# Patient Record
Sex: MALE | Race: WHITE | Employment: OTHER | URBAN - METROPOLITAN AREA
[De-identification: names, ages, dates, MRNs, and addresses within clinical notes are randomized per-mention and may not be internally consistent; named-entity substitution may affect disease eponyms.]

---

## 2017-01-06 ENCOUNTER — OFFICE VISIT (OUTPATIENT)
Dept: CARDIOLOGY CLINIC | Age: 82
End: 2017-01-06

## 2017-01-06 VITALS
SYSTOLIC BLOOD PRESSURE: 112 MMHG | WEIGHT: 192 LBS | HEART RATE: 68 BPM | DIASTOLIC BLOOD PRESSURE: 60 MMHG | BODY MASS INDEX: 26.04 KG/M2

## 2017-01-06 DIAGNOSIS — I35.1 NONRHEUMATIC AORTIC VALVE INSUFFICIENCY: ICD-10-CM

## 2017-01-06 DIAGNOSIS — I34.0 NON-RHEUMATIC MITRAL REGURGITATION: ICD-10-CM

## 2017-01-06 DIAGNOSIS — I10 ESSENTIAL HYPERTENSION: Primary | ICD-10-CM

## 2017-01-06 PROCEDURE — 99214 OFFICE O/P EST MOD 30 MIN: CPT | Performed by: INTERNAL MEDICINE

## 2017-05-24 RX ORDER — ATENOLOL 25 MG/1
TABLET ORAL
Qty: 30 TABLET | Refills: 6 | Status: SHIPPED | OUTPATIENT
Start: 2017-05-24 | End: 2018-04-11 | Stop reason: SDUPTHER

## 2017-05-30 ENCOUNTER — OFFICE VISIT (OUTPATIENT)
Dept: CARDIOLOGY CLINIC | Age: 82
End: 2017-05-30

## 2017-05-30 VITALS
HEART RATE: 62 BPM | SYSTOLIC BLOOD PRESSURE: 118 MMHG | BODY MASS INDEX: 26.42 KG/M2 | DIASTOLIC BLOOD PRESSURE: 60 MMHG | WEIGHT: 194.8 LBS

## 2017-05-30 DIAGNOSIS — I34.0 NON-RHEUMATIC MITRAL REGURGITATION: ICD-10-CM

## 2017-05-30 DIAGNOSIS — I35.1 NONRHEUMATIC AORTIC VALVE INSUFFICIENCY: ICD-10-CM

## 2017-05-30 DIAGNOSIS — I10 ESSENTIAL HYPERTENSION: Primary | ICD-10-CM

## 2017-05-30 PROCEDURE — 99214 OFFICE O/P EST MOD 30 MIN: CPT | Performed by: INTERNAL MEDICINE

## 2017-12-08 ENCOUNTER — OFFICE VISIT (OUTPATIENT)
Dept: CARDIOLOGY CLINIC | Age: 82
End: 2017-12-08

## 2017-12-08 VITALS
DIASTOLIC BLOOD PRESSURE: 78 MMHG | BODY MASS INDEX: 26.18 KG/M2 | WEIGHT: 193 LBS | SYSTOLIC BLOOD PRESSURE: 126 MMHG | HEART RATE: 68 BPM

## 2017-12-08 DIAGNOSIS — I10 ESSENTIAL HYPERTENSION: Primary | ICD-10-CM

## 2017-12-08 DIAGNOSIS — I35.1 NONRHEUMATIC AORTIC VALVE INSUFFICIENCY: ICD-10-CM

## 2017-12-08 DIAGNOSIS — I34.0 NON-RHEUMATIC MITRAL REGURGITATION: ICD-10-CM

## 2017-12-08 PROCEDURE — 99214 OFFICE O/P EST MOD 30 MIN: CPT | Performed by: INTERNAL MEDICINE

## 2017-12-08 NOTE — PROGRESS NOTES
Subjective:      Patient ID: Devi Traore is a 80 y.o. male. HPI:  Altaf Cao is being seen for his 4 month follow up appointment  HTN/AI/MR. Continues to do well. No exertional sx. No edema. No pnd or orthopnea. BP ok at home. Wt stable. Past Medical History:   Diagnosis Date    Aortic valve disorders     Essential hypertension     Hearing loss     Hyperlipidemia     Hypertension     Kidney stone     Mitral valve disorders(424.0)      Past Surgical History:   Procedure Laterality Date    ANKLE SURGERY      FRACTURE SURGERY      JOINT REPLACEMENT      MASTOID SURGERY      TOTAL KNEE ARTHROPLASTY         No Known Allergies     Social History     Social History    Marital status:      Spouse name: N/A    Number of children: 5    Years of education: N/A     Occupational History    Not on file. Social History Main Topics    Smoking status: Never Smoker    Smokeless tobacco: Never Used    Alcohol use 0.0 oz/week      Comment: Occasionally    Drug use: No    Sexual activity: Not on file     Other Topics Concern    Not on file     Social History Narrative    ** Merged History Encounter **             Patient has a family history includes Cancer in his father and mother. Patient  has a past medical history of Aortic valve disorders; Essential hypertension; Hearing loss; Hyperlipidemia; Hypertension; Kidney stone; and Mitral valve disorders(424.0). Current Outpatient Prescriptions   Medication Sig Dispense Refill    atenolol (TENORMIN) 25 MG tablet TAKE 1 TABLET BY MOUTH DAILY. 30 tablet 6    atorvastatin (LIPITOR) 40 MG tablet Take 40 mg by mouth daily.  hydrochlorothiazide (HYDRODIURIL) 25 MG tablet Take 1 tablet by mouth daily. 30 tablet 6    potassium chloride (KLOR-CON) 20 MEQ packet Take 20 mEq by mouth twice a week.  aspirin 81 MG EC tablet Take 81 mg by mouth daily. No current facility-administered medications for this visit. Vitals  Weight: 193 lb (87.5 kg)  Blood Pressure: BP: (126)/(78)    Pulse: 68           Review of Systems   Constitutional: Negative for activity change and fatigue. Respiratory: Negative for apnea, cough, choking, chest tightness and shortness of breath. Cardiovascular: Negative for chest pain, palpitations and leg swelling. No PND or orthopnea. No tachycardia. Gastrointestinal: Negative for abdominal distention. Musculoskeletal: Negative for myalgias. Neurological: Negative for dizziness, syncope and light-headedness. Psychiatric/Behavioral: Negative for behavioral problems and confusion. Other systems reviewed negative as done. Objective:   Physical Exam   Constitutional: He is oriented to person, place, and time. He appears well-developed and well-nourished. No distress. HENT:   Head: Normocephalic and atraumatic. Eyes: Conjunctivae and EOM are normal. Right eye exhibits no discharge. Left eye exhibits no discharge. Neck: Normal range of motion. Neck supple. No JVD present. Cardiovascular: Normal rate, regular rhythm, S1 normal and S2 normal.  Exam reveals no gallop. Murmur heard. Systolic murmur is present with a grade of 1/6   Pulses:       Radial pulses are 2+ on the right side, and 2+ on the left side. Pulmonary/Chest: Effort normal and breath sounds normal. No respiratory distress. He has no wheezes. He has no rales. Abdominal: Soft. Bowel sounds are normal. No tenderness. Musculoskeletal: Normal range of motion. He exhibits no edema and no tenderness. Neurological: He is alert and oriented to person, place, and time. Skin: Skin is warm and dry. Psychiatric: He has a normal mood and affect. His behavior is normal. Thought content normal.       Assessment:       1. Essential hypertension     2. Nonrheumatic aortic valve insufficiency     3. Non-rheumatic mitral regurgitation             Plan:      CV stable. Remains compensated. bp is good. . Remains active without sx. No changes. Continue to monitor. Reviewed previous records and testing including echo 8/16. Follow up 4 months.

## 2018-04-06 ENCOUNTER — OFFICE VISIT (OUTPATIENT)
Dept: CARDIOLOGY CLINIC | Age: 83
End: 2018-04-06

## 2018-04-06 VITALS
WEIGHT: 194 LBS | DIASTOLIC BLOOD PRESSURE: 70 MMHG | HEART RATE: 60 BPM | SYSTOLIC BLOOD PRESSURE: 118 MMHG | BODY MASS INDEX: 26.31 KG/M2

## 2018-04-06 DIAGNOSIS — I10 ESSENTIAL HYPERTENSION: Primary | ICD-10-CM

## 2018-04-06 DIAGNOSIS — I34.0 NON-RHEUMATIC MITRAL REGURGITATION: ICD-10-CM

## 2018-04-06 DIAGNOSIS — I35.1 NONRHEUMATIC AORTIC VALVE INSUFFICIENCY: ICD-10-CM

## 2018-04-06 PROCEDURE — 99214 OFFICE O/P EST MOD 30 MIN: CPT | Performed by: INTERNAL MEDICINE

## 2018-04-17 ENCOUNTER — TELEPHONE (OUTPATIENT)
Dept: CARDIOLOGY CLINIC | Age: 83
End: 2018-04-17

## 2018-04-17 RX ORDER — ATENOLOL 25 MG/1
TABLET ORAL
Qty: 30 TABLET | Refills: 5 | Status: SHIPPED | OUTPATIENT
Start: 2018-04-17 | End: 2018-11-03 | Stop reason: SDUPTHER

## 2018-08-17 ENCOUNTER — OFFICE VISIT (OUTPATIENT)
Dept: CARDIOLOGY CLINIC | Age: 83
End: 2018-08-17

## 2018-08-17 VITALS
HEART RATE: 64 BPM | WEIGHT: 196 LBS | BODY MASS INDEX: 26.58 KG/M2 | DIASTOLIC BLOOD PRESSURE: 68 MMHG | SYSTOLIC BLOOD PRESSURE: 102 MMHG

## 2018-08-17 DIAGNOSIS — I10 ESSENTIAL HYPERTENSION: Primary | ICD-10-CM

## 2018-08-17 DIAGNOSIS — I35.1 NONRHEUMATIC AORTIC VALVE INSUFFICIENCY: ICD-10-CM

## 2018-08-17 DIAGNOSIS — I34.0 NON-RHEUMATIC MITRAL REGURGITATION: ICD-10-CM

## 2018-08-17 PROCEDURE — 99214 OFFICE O/P EST MOD 30 MIN: CPT | Performed by: INTERNAL MEDICINE

## 2018-08-17 NOTE — PROGRESS NOTES
active. No changes. Continue to monitor. Reviewed previous records and testing including echo 8/16. Follow up 4 months.

## 2018-11-08 RX ORDER — ATENOLOL 25 MG/1
TABLET ORAL
Qty: 30 TABLET | Refills: 5 | Status: SHIPPED | OUTPATIENT
Start: 2018-11-08 | End: 2019-06-15 | Stop reason: SDUPTHER

## 2019-01-11 ENCOUNTER — OFFICE VISIT (OUTPATIENT)
Dept: CARDIOLOGY CLINIC | Age: 84
End: 2019-01-11
Payer: MEDICARE

## 2019-01-11 VITALS
HEART RATE: 54 BPM | SYSTOLIC BLOOD PRESSURE: 124 MMHG | DIASTOLIC BLOOD PRESSURE: 70 MMHG | BODY MASS INDEX: 26.94 KG/M2 | WEIGHT: 198.6 LBS

## 2019-01-11 DIAGNOSIS — I10 ESSENTIAL HYPERTENSION: Primary | ICD-10-CM

## 2019-01-11 DIAGNOSIS — I35.1 NONRHEUMATIC AORTIC VALVE INSUFFICIENCY: ICD-10-CM

## 2019-01-11 DIAGNOSIS — I34.0 NON-RHEUMATIC MITRAL REGURGITATION: ICD-10-CM

## 2019-01-11 PROCEDURE — 99214 OFFICE O/P EST MOD 30 MIN: CPT | Performed by: INTERNAL MEDICINE

## 2019-04-30 ENCOUNTER — HOSPITAL ENCOUNTER (OUTPATIENT)
Dept: PHYSICAL THERAPY | Age: 84
Setting detail: THERAPIES SERIES
Discharge: HOME | End: 2019-04-30
Payer: MEDICARE

## 2019-04-30 PROCEDURE — 97530 THERAPEUTIC ACTIVITIES: CPT | Performed by: CHIROPRACTOR

## 2019-04-30 PROCEDURE — 97161 PT EVAL LOW COMPLEX 20 MIN: CPT | Performed by: CHIROPRACTOR

## 2019-04-30 NOTE — FLOWSHEET NOTE
Physical Therapy Daily Treatment Note  Date:  2019    Patient Name:  John Song    :  10/22/1925  MRN: [id number]  Restrictions/Precautions:    Pertinent Medical History:  Medical/Treatment Diagnosis Information:  · Diagnosis: Leg weakness, Decreased Balance  ·    Insurance/Certification information:    [employer]  Physician Information:      Dr. Aditi Medrano of care signed (Y/N):  Faxed   Visit# / total visits:  1/  Pain level: 0/10     G-Code (if applicable):  LEFS - 43       Progress Note: []  Yes  []  No  Next due by: Visit #10      History of Injury: Pt has had 3 falls in the last month    Subjective:   C/o decreased balance / \"shuffling gait\"    Objective:  Observation:   Test measurements:      Exercises:  Exercise/Equipment Resistance/Repetitions Other comments        Reviewed home exer          Nu-step X 1 min    GSS X 30 sec    HSS     HR/TR     Stand abd 0# - 1x10   R/L    Rocker board  (f/b, s/s) x30 sec    PWB gumdrop circles Cw/ccw x10   R/L         In P-bars     tandem stance X 30 sec     R/L    Toe taps       6\" x10    R/L    Tandem gait      Lateral gait                      Other Therapeutic Activities:      Home Exercise Program:  Voiced understanding of home exer    Manual Treatments:      Modalities:      Timed Code Treatment Minutes:  30    Total Treatment Minutes:  45    Assessment  [x] Patient tolerated treatment well [] Patient limited by fatigue  [] Patient limited by pain  [] Patient limited by other medical complications  [] Other:         Prognosis: [x] Good [] Fair  [] Poor    Patient Requires Follow-up: [x] Yes  [] No    Plan:   [x] Continue per plan of care [] Alter current plan (see comments)  [] Plan of care initiated [] Hold pending MD visit [] Discharge  Plan for Next Session:  Increase exer as above    Electronically signed by:  Jorge Alberto MORRELL#[id number]

## 2019-04-30 NOTE — PROGRESS NOTES
Physical Therapy  Initial Assessment  Date: 2019  Patient Name: Jessica Jiménez  MRN: [id number]  : 10/22/1925          Restrictions  Restrictions/Precautions  Restrictions/Precautions: Fall Risk(Mod fall risk)    Subjective   General  Chart Reviewed: Yes  Patient assessed for rehabilitation services?: Yes  Additional Pertinent Hx: HTN, TKA  Family / Caregiver Present: No  Referring Practitioner: Dr. Sun Valdez  Referral Date : 19  Diagnosis: Leg weakness, Decreased Balance  PT Visit Information  Onset Date: (Pt states he has had 3 falls in the last month)  PT Insurance Information: [facility] Medicare  Total # of Visits to Date: 1  Subjective  Subjective: C/o decreased balance   Pain Screening  Patient Currently in Pain: (No pain at present.  Only had pain after his falls)         Objective     Observation/Palpation  Posture: Good    PROM RLE (degrees)  RLE PROM: WFL  PROM LLE (degrees)  LLE PROM: [facility]    Strength RLE  Strength RLE: (Ankle DF/PF 3/5, knee flex/ext 5/5, hip flex 4/5, ext 5/5)  Strength LLE  Strength LLE: (Ankle DF/PF 3/5, knee flex/ext 5/5, hip flex 4/5, ext 5/5)     Additional Measures  Other: Unable to single leg balance without handheld assist  Sensation  Overall Sensation Status: [facility]             Ambulation  Ambulation?: (Amb without any assistive device, but with shortened \"shuffling\" gait)                            Assessment   Conditions Requiring Skilled Therapeutic Intervention  Body structures, Functions, Activity limitations: Decreased balance;Decreased strength  Assessment: Prior level of function: Amb with falling  Prognosis: Good  Decision Making: Low Complexity  REQUIRES PT FOLLOW UP: Yes  Activity Tolerance  Activity Tolerance: Patient Tolerated treatment well         Plan   Plan  Times per week: 2x/wk x 6 weeks  Current Treatment Recommendations: Strengthening, Balance Training, Home Exercise Program    G-Code       OutComes Score  LEFS Total Score: 43                                                      Goals  Short term goals  Time Frame for Short term goals: 3 weeks  Short term goal 1: Be independent with home exer  Short term goal 2: Improve gait pattern  Long term goals  Time Frame for Long term goals : 6 weeks  Long term goal 1: Eliminate falls  Patient Goals   Patient goals : \"Not to fall\"        Chris Wood #[id number]

## 2019-05-06 ENCOUNTER — HOSPITAL ENCOUNTER (OUTPATIENT)
Dept: PHYSICAL THERAPY | Age: 84
Setting detail: THERAPIES SERIES
Discharge: HOME | End: 2019-05-06
Payer: MEDICARE

## 2019-05-06 PROCEDURE — 97110 THERAPEUTIC EXERCISES: CPT | Performed by: CHIROPRACTOR

## 2019-05-06 NOTE — FLOWSHEET NOTE
Physical Therapy Daily Treatment Note  Date:  2019    Patient Name:  Garrison Zhang    :  10/22/1925  MRN: [id number]  Restrictions/Precautions:    Pertinent Medical History:  Medical/Treatment Diagnosis Information:  · Diagnosis: Leg weakness, Decreased Balance     Insurance/Certification information:    [employer]  Physician Information:      Dr. Nathaniel Morales of care signed (Y/N): Yes    Visit# / total visits:  2/7   By 19  Pain level: 0/10     G-Code (if applicable):  LEFS - 43       Progress Note: []  Yes  []  No  Next due by: Visit #10      History of Injury: Pt has had 3 falls in the last month    Subjective:   C/o decreased balance / \"shuffling gait\"    Objective:  Observation:   Test measurements:      Exercises:  Exercise/Equipment Resistance/Repetitions Other comments        Reviewed home exer          Nu-step X4  min    GSS  30 sec x 2    HSS 30 sec x2    In/Ev x10    HR/TR x10    Stand abd 0# - 2x10   R/L    Rocker board  (f/b, s/s) x30 sec    PWB gumdrop circles Cw/ccw x10   R/L         In P-bars     tandem stance X 30 sec     R/L    Toe taps       6\" x10    R/L    Tandem gait  X 2 lengths    Lateral gait X 3 lengths    BOSU Balance - no rails  March x 30 sec - 2 rails                Other Therapeutic Activities:      Home Exercise Program:  Voiced understanding of home exer    Manual Treatments:      Modalities:      Timed Code Treatment Minutes:  30    Total Treatment Minutes:  45    Assessment  [x] Patient tolerated treatment well [] Patient limited by fatigue  [] Patient limited by pain  [] Patient limited by other medical complications  [] Other:         Prognosis: [x] Good [] Fair  [] Poor    Patient Requires Follow-up: [x] Yes  [] No    Plan:   [x] Continue per plan of care [] Alter current plan (see comments)  [] Plan of care initiated [] Hold pending MD visit [] Discharge  Plan for Next Session:  Increase exer as above    Electronically signed by: Marcos Mueller BC#[id number]

## 2019-05-10 ENCOUNTER — HOSPITAL ENCOUNTER (OUTPATIENT)
Dept: PHYSICAL THERAPY | Age: 84
Setting detail: THERAPIES SERIES
Discharge: HOME | End: 2019-05-10
Payer: MEDICARE

## 2019-05-10 PROCEDURE — 97530 THERAPEUTIC ACTIVITIES: CPT

## 2019-05-10 PROCEDURE — 97110 THERAPEUTIC EXERCISES: CPT

## 2019-05-10 NOTE — FLOWSHEET NOTE
Physical Therapy Daily Treatment Note  Date:  5/10/2019    Patient Name:  Alana Martinez    :  10/22/1925  MRN: [id number]  Restrictions/Precautions:    Pertinent Medical History:  Medical/Treatment Diagnosis Information:  · Diagnosis: Leg weakness, Decreased Balance     Insurance/Certification information:    [employer]  Physician Information:      Dr. Isaías Chaidez of care signed (Y/N): Yes    Visit# / total visits:  3/7   By 19  Pain level: 0/10     G-Code (if applicable):  LEFS - 43       Progress Note: []  Yes  []  No  Next due by: Visit #10      History of Injury: Pt has had 3 falls in the last month    Subjective:   C/o decreased balance / \"shuffling gait\"     Objective:  Observation:   Test measurements:      Exercises:  Exercise/Equipment Resistance/Repetitions Other comments        Reviewed home exer          Nu-step X5  min    GSS  30 sec x 2    HSS 30 sec x2    In/Ev x10    HR/TR x10    Stand abd 0# - 2x10   R/L    Rocker board  (f/b, s/s) x30 sec    PWB gumdrop circles Cw/ccw x10   R/L         In P-bars     tandem stance X 30 sec     R/L    Toe taps       6\" x10    R/L    Tandem gait  X 2 lengths    Lateral gait X 3 lengths    BOSU Balance - no rails  March x 30 sec - 2 rails    1 foot on step 30 sec R/L         Gait 2 laps around clinic focusing on heel strike and toe push off, longer strides      Other Therapeutic Activities:      Home Exercise Program:  Voiced understanding of home exer    Manual Treatments:      Modalities:      Timed Code Treatment Minutes:  40    Total Treatment Minutes:  40    Assessment  [x] Patient tolerated treatment well [] Patient limited by fatigue  [] Patient limited by pain  [] Patient limited by other medical complications  [] Other:         Prognosis: [x] Good [] Fair  [] Poor    Patient Requires Follow-up: [x] Yes  [] No    Plan:   [x] Continue per plan of care [] Alter current plan (see comments)  [] Plan of care initiated [] Hold pending MD visit [] Discharge  Plan for Next Session:  Increase exer as above    Electronically signed by:  Tyson Kyle, PTA [id number]

## 2019-05-13 ENCOUNTER — HOSPITAL ENCOUNTER (OUTPATIENT)
Dept: PHYSICAL THERAPY | Age: 84
Setting detail: THERAPIES SERIES
Discharge: HOME | End: 2019-05-13
Payer: MEDICARE

## 2019-05-13 PROCEDURE — 97110 THERAPEUTIC EXERCISES: CPT | Performed by: CHIROPRACTOR

## 2019-05-13 NOTE — FLOWSHEET NOTE
Physical Therapy Daily Treatment Note  Date:  2019    Patient Name:  Karrie Buerger    :  10/22/1925  MRN: [id number]  Restrictions/Precautions:    Pertinent Medical History:  Medical/Treatment Diagnosis Information:  · Diagnosis: Leg weakness, Decreased Balance     Insurance/Certification information:    [employer] [facility] Medicare  Physician Information:      Dr. Galicia [facility] of care signed (Y/N): Yes    Visit# / total visits:  4/7   By 19  Pain level: 0/10     G-Code (if applicable):  LEFS - 43       Progress Note: []  Yes  []  No  Next due by: Visit #10      History of Injury: Pt has had 3 falls in the last month    Subjective:  States he has not had any falls    Objective:  Observation:   Test measurements:      Exercises:  Exercise/Equipment Resistance/Repetitions Other comments        Reviewed home exer          Nu-step X5  min    GSS  30 sec x 2    HSS 30 sec x2    In/Ev x10    HR/TR x10    Stand abd 1# - 2x10   R/L    Rocker board  (f/b, s/s) x30 sec    PWB gumdrop circles Cw/ccw x10   R/L         In P-bars     tandem stance X 45 sec     R/L    Toe taps       6\" x10    R/L    Tandem gait  X 2 lengths    Lateral gait X 2 lengths    BOSU Balance - no rails  March x 30 sec - 2 rails    1 foot on step 30 sec R/L         Gait 2 laps around clinic focusing on heel strike and toe push off, longer strides      Other Therapeutic Activities:      Home Exercise Program:  Voiced understanding of home exer    Manual Treatments:      Modalities:      Timed Code Treatment Minutes:  35    Total Treatment Minutes:  35    Assessment  [x] Patient tolerated treatment well [] Patient limited by fatigue  [] Patient limited by pain  [] Patient limited by other medical complications  [] Other:         Prognosis: [x] Good [] Fair  [] Poor    Patient Requires Follow-up: [x] Yes  [] No    Plan:   [x] Continue per plan of care [] Alter current plan (see comments)  [] Plan of care initiated [] Hold pending MD visit [] Discharge  Plan for Next Session:  Increase exer as above    Electronically signed by:  Hilma Runner #[id number]

## 2019-05-17 ENCOUNTER — HOSPITAL ENCOUNTER (OUTPATIENT)
Dept: PHYSICAL THERAPY | Age: 84
Setting detail: THERAPIES SERIES
Discharge: HOME | End: 2019-05-17
Payer: MEDICARE

## 2019-05-17 PROCEDURE — 97110 THERAPEUTIC EXERCISES: CPT | Performed by: CHIROPRACTOR

## 2019-05-17 NOTE — FLOWSHEET NOTE
Physical Therapy Daily Treatment Note  Date:  2019    Patient Name:  Makeda Dias    :  10/22/1925  MRN: [id number]  Restrictions/Precautions:    Pertinent Medical History:  Medical/Treatment Diagnosis Information:  · Diagnosis: Leg weakness, Decreased Balance     Insurance/Certification information:    [employer]  Physician Information:      Dr. Varun Cruz of care signed (Y/N): Yes    Visit# / total visits:     By 19  Pain level: 0/10     G-Code (if applicable):  LEFS - 43       Progress Note: []  Yes  []  No  Next due by: Visit #10      History of Injury: Pt has had 3 falls in the last month    Subjective:   Feeling tired today    Objective:  Observation:   Test measurements:      Exercises:  Exercise/Equipment Resistance/Repetitions Other comments        Reviewed home exer          Nu-step        #8 X5  min    GSS  30 sec x 2    HSS 30 sec x2    In/Ev x10    HR/TR x10    Stand abd 1# - 2x10   R/L    Rocker board  (f/b, s/s) x30 sec    PWB gumdrop circles Cw/ccw x10   R/L         In P-bars     tandem stance X 45 sec     R/L    Toe taps       6\" x10    R/L    Tandem gait  X 2 lengths    Lateral gait X 2 lengths    BOSU Balance - no rails  March x 30 sec - 2 rails    1 foot on step 30 sec R/L         Gait Worked on increasing hip flexion      Other Therapeutic Activities:      Home Exercise Program:  Voiced understanding of home exer    Manual Treatments:      Modalities:      Timed Code Treatment Minutes:  35    Total Treatment Minutes:  35    Assessment  [x] Patient tolerated treatment well [] Patient limited by fatigue  [] Patient limited by pain  [] Patient limited by other medical complications  [] Other:         Prognosis: [x] Good [] Fair  [] Poor    Patient Requires Follow-up: [x] Yes  [] No    Plan:   [x] Continue per plan of care [] Alter current plan (see comments)  [] Plan of care initiated [] Hold pending MD visit [] Discharge  Plan for Next Session:  Increase exer as above    Electronically signed by:  Kale LANGLEY#[id number]

## 2019-05-20 ENCOUNTER — HOSPITAL ENCOUNTER (OUTPATIENT)
Dept: PHYSICAL THERAPY | Age: 84
Setting detail: THERAPIES SERIES
Discharge: HOME | End: 2019-05-20
Payer: MEDICARE

## 2019-05-20 PROCEDURE — 97110 THERAPEUTIC EXERCISES: CPT | Performed by: CHIROPRACTOR

## 2019-05-20 NOTE — FLOWSHEET NOTE
Physical Therapy Daily Treatment Note  Date:  2019    Patient Name:  Blake Mcbride    :  10/22/1925  MRN: [id number]  Restrictions/Precautions:    Pertinent Medical History:  Medical/Treatment Diagnosis Information:  · Diagnosis: Leg weakness, Decreased Balance     Insurance/Certification information:    [employer]  Physician Information:      Dr. Grazyna Aguirre of care signed (Y/N): Yes    Visit# / total visits:     By 19  Pain level: 0/10     G-Code (if applicable):  LEFS - 43       Progress Note: []  Yes  []  No  Next due by: Visit #10      History of Injury: Pt has had 3 falls in the last month    Subjective:  Feeling much better.  Has not had any falls /loss of balance    Objective:  Observation:   Test measurements:      Exercises:  Exercise/Equipment Resistance/Repetitions Other comments        Reviewed home exer          Nu-step        #8 X5  min    GSS  30 sec x 2    HSS 30 sec x2    In/Ev x10    HR/TR x10    Stand abd 1# - 2x10   R/L    Rocker board  (f/b, s/s) x30 sec    PWB gumdrop circles Cw/ccw x10   R/L         In P-bars     tandem stance X 45 sec     R/L    Toe taps       6\" x10    R/L    Tandem gait  X 2 lengths    Lateral gait X 2 lengths    BOSU Balance - no rails  March x 30 sec - 2 rails    1 foot on step 30 sec R/L         Gait Worked on increasing hip flexion Improved     Other Therapeutic Activities:      Home Exercise Program:  Voiced understanding of home exer    Manual Treatments:      Modalities:      Timed Code Treatment Minutes:  35    Total Treatment Minutes:  35    Assessment  [x] Patient tolerated treatment well [] Patient limited by fatigue  [] Patient limited by pain  [] Patient limited by other medical complications  [] Other:         Prognosis: [x] Good [] Fair  [] Poor    Patient Requires Follow-up: [x] Yes  [] No    Plan:   [x] Continue per plan of care [] Alter current plan (see comments)  [] Plan of care initiated [] Hold

## 2019-05-24 ENCOUNTER — HOSPITAL ENCOUNTER (OUTPATIENT)
Dept: PHYSICAL THERAPY | Age: 84
Setting detail: THERAPIES SERIES
Discharge: HOME | End: 2019-05-24
Payer: MEDICARE

## 2019-05-24 PROCEDURE — 97530 THERAPEUTIC ACTIVITIES: CPT

## 2019-05-24 PROCEDURE — 97110 THERAPEUTIC EXERCISES: CPT

## 2019-05-24 NOTE — FLOWSHEET NOTE
Physical Therapy Daily Treatment Note  Date:  2019    Patient Name:  Alivia Hanks    :  10/22/1925  MRN: [id number]  Restrictions/Precautions:    Pertinent Medical History:  Medical/Treatment Diagnosis Information:  · Diagnosis: Leg weakness, Decreased Balance     Insurance/Certification information:    [employer]  Physician Information:      Dr. Heather Geiger of care signed (Y/N): Yes    Visit# / total visits:     By 19  Pain level: 0/10     G-Code (if applicable):  LEFS - 43       Progress Note: []  Yes  []  No  Next due by: Visit #10      History of Injury: Pt has had 3 falls in the last month    Subjective:      Objective:  Observation:   Test measurements:      Exercises:  Exercise/Equipment Resistance/Repetitions Other comments        Reviewed home exer          Nu-step        #8 X5  min    GSS  30 sec x 2    HSS 30 sec x2    In/Ev x10    HR/TR x10    Stand abd             # - 2x10   R/L    Rocker board  (f/b, s/s) x30 sec    PWB gumdrop circles Cw/ccw x10   R/L         In P-bars     tandem stance X 45 sec     R/L    Toe taps       6\" x10    R/L    Tandem gait  X 2 lengths    Lateral gait X 2 lengths    BOSU Balance - no rails  March x 30 sec - 2 rails    1 foot on gumdrop 30 sec R/L         Gait Worked on increasing hip flexion Improved     Other Therapeutic Activities:      Home Exercise Program:  Voiced understanding of home exer  19: updated written HEP    Manual Treatments:      Modalities:      Timed Code Treatment Minutes:  35    Total Treatment Minutes:  35    Assessment  [x] Patient tolerated treatment well [] Patient limited by fatigue  [] Patient limited by pain  [] Patient limited by other medical complications  [] Other:         Prognosis: [x] Good [] Fair  [] Poor    Patient Requires Follow-up: [x] Yes  [] No    Plan:   [] Continue per plan of care [] Alter current plan (see comments)  [] Plan of care initiated [] Hold pending MD visit [x] Discharge  Plan for Next Session:  D/C with HEP    Electronically signed by:  Gladys Oates, PTA [id number]

## 2019-06-17 RX ORDER — ATENOLOL 25 MG/1
TABLET ORAL
Qty: 30 TABLET | Refills: 5 | Status: SHIPPED | OUTPATIENT
Start: 2019-06-17 | End: 2019-12-11 | Stop reason: SDUPTHER

## 2019-06-17 NOTE — TELEPHONE ENCOUNTER
Requested Prescriptions     Pending Prescriptions Disp Refills    atenolol (TENORMIN) 25 MG tablet [Pharmacy Med Name: ATENOLOL 25 MG TABLET] 30 tablet 1     Sig: TAKE 1 TABLET BY MOUTH DAILY.        Number: 30    Refills: 5    Last Office Visit: 01/11/2019    Next Office Visit: 06/25/2019    Last Refill: 11/08/2018    Last Labs: 04/12/2017 CBC

## 2019-06-21 NOTE — DISCHARGE SUMMARY
Physical Therapy Discharge Note  Date: 2019    Patient Name: John Song  : 10/22/1925  MRN: [id number]    Diagnosis: Leg weakness, Decreased Balance    Referring Physician:  Dr. Manny Lee    Dates of Service:   19 - 19  Total # of Visits:   7           Treatment to Date:  [x] Therapeutic Exercise  [] Modalities:  [x] Therapeutic Activity     [] Ultrasound  [] Electrical Stim   [x] Gait Training      [] Cervical Traction    [] Lumbar Traction  [] Neuromuscular Re-education  [] Cold/hotpack [] Iontophoresis  [x] Instruction in HEP      Other:  [] Manual Therapy       []    [] Aquatic Therapy       []                    ? Significant Findings At Last Visit:         Has not had anymore falls  Independent with home exer       Goal Status:  [x] Achieved [] Partially Achieved  [] Not Achieved     Reason for Discharge:  [x] Goals Met   [] Patient did not return after initial evaluation   [] Progress Plateaued [] Missed _____ scheduled appointments   [] No insurance coverage [] Patient terminated therapy   [] Other:        PT recommendation:   [x] Patient now discharged with HEP      [] Other:    Discharge discussed with:  [x] Patient  [] Caregiver      [] Other        Electronically signed by:  Jorge Alberto GOLDEN#[id number]    If you have any questions or concerns, please don't hesitate to call.   Thank you for your referral.
DISCHARGE

## 2019-06-25 ENCOUNTER — OFFICE VISIT (OUTPATIENT)
Dept: CARDIOLOGY CLINIC | Age: 84
End: 2019-06-25
Payer: MEDICARE

## 2019-06-25 VITALS
WEIGHT: 197 LBS | SYSTOLIC BLOOD PRESSURE: 118 MMHG | BODY MASS INDEX: 26.72 KG/M2 | HEART RATE: 68 BPM | DIASTOLIC BLOOD PRESSURE: 70 MMHG

## 2019-06-25 DIAGNOSIS — I34.0 NON-RHEUMATIC MITRAL REGURGITATION: ICD-10-CM

## 2019-06-25 DIAGNOSIS — I10 ESSENTIAL HYPERTENSION: Primary | ICD-10-CM

## 2019-06-25 DIAGNOSIS — I35.1 NONRHEUMATIC AORTIC VALVE INSUFFICIENCY: ICD-10-CM

## 2019-06-25 PROCEDURE — 99214 OFFICE O/P EST MOD 30 MIN: CPT | Performed by: INTERNAL MEDICINE

## 2019-06-25 NOTE — PROGRESS NOTES
Subjective:      Patient ID: Clare Rock is a 80 y.o. male. HPI:  Priscilla Salas is being seen for his 4 month follow up appointment  HTN/AI/MR. No complaints. Keeps active  No exertional sx. No edema. No pnd or orthopnea. No palp/tachy/syncope. Wt stable. Still takng stairs. No sob. Past Medical History:   Diagnosis Date    Aortic valve disorders     Essential hypertension     Hearing loss     Hyperlipidemia     Hypertension     Kidney stone     Mitral valve disorders(424.0)      Past Surgical History:   Procedure Laterality Date    ANKLE SURGERY      FRACTURE SURGERY      JOINT REPLACEMENT      MASTOID SURGERY      TOTAL KNEE ARTHROPLASTY         No Known Allergies     Social History     Socioeconomic History    Marital status:      Spouse name: Not on file    Number of children: 11    Years of education: Not on file    Highest education level: Not on file   Occupational History    Not on file   Social Needs    Financial resource strain: Not on file    Food insecurity:     Worry: Not on file     Inability: Not on file    Transportation needs:     Medical: Not on file     Non-medical: Not on file   Tobacco Use    Smoking status: Never Smoker    Smokeless tobacco: Never Used   Substance and Sexual Activity    Alcohol use:  Yes     Alcohol/week: 0.0 oz     Comment: Occasionally    Drug use: No    Sexual activity: Not on file   Lifestyle    Physical activity:     Days per week: Not on file     Minutes per session: Not on file    Stress: Not on file   Relationships    Social connections:     Talks on phone: Not on file     Gets together: Not on file     Attends Jain service: Not on file     Active member of club or organization: Not on file     Attends meetings of clubs or organizations: Not on file     Relationship status: Not on file    Intimate partner violence:     Fear of current or ex partner: Not on file     Emotionally abused: Not on file Physically abused: Not on file     Forced sexual activity: Not on file   Other Topics Concern    Not on file   Social History Narrative    ** Merged History Encounter **             Patient has a family history includes Cancer in his father and mother. Patient  has a past medical history of Aortic valve disorders, Essential hypertension, Hearing loss, Hyperlipidemia, Hypertension, Kidney stone, and Mitral valve disorders(424.0). Current Outpatient Medications   Medication Sig Dispense Refill    atenolol (TENORMIN) 25 MG tablet TAKE 1 TABLET BY MOUTH DAILY. 30 tablet 5    atorvastatin (LIPITOR) 40 MG tablet Take 40 mg by mouth daily.  hydrochlorothiazide (HYDRODIURIL) 25 MG tablet Take 1 tablet by mouth daily. 30 tablet 6    potassium chloride (KLOR-CON) 20 MEQ packet Take 20 mEq by mouth twice a week.  aspirin 81 MG EC tablet Take 81 mg by mouth daily. No current facility-administered medications for this visit. Vitals:    06/25/19 0948   BP: 118/70   Pulse: 68       wt 197          Review of Systems   Constitutional: Negative for activity change and fatigue. Respiratory: Negative for apnea, cough, choking, chest tightness and shortness of breath. Cardiovascular: Negative for chest pain, palpitations and leg swelling. No PND or orthopnea. No tachycardia. Gastrointestinal: Negative for abdominal distention. Musculoskeletal: Negative for myalgias. Neurological: Negative for dizziness, syncope and light-headedness. Psychiatric/Behavioral: Negative for behavioral problems and confusion. All other systems reviewed negative as done. Objective:   Physical Exam   Constitutional: He is oriented to person, place, and time. He appears well-developed and well-nourished. No distress. HENT:   Head: Normocephalic and atraumatic. Eyes: Conjunctivae and EOM are normal. Right eye exhibits no discharge. Left eye exhibits no discharge. Neck: Normal range of motion. Neck supple. No JVD present. Cardiovascular: Normal rate, regular rhythm, S1 normal and S2 normal.  Exam reveals no gallop. Murmur heard. Systolic murmur is present with a grade of 1/6   Pulses:       Radial pulses are 2+ on the right side, and 2+ on the left side. Pulmonary/Chest: Effort normal and breath sounds normal. No respiratory distress. He has no wheezes. He has no rales. Abdominal: Soft. Bowel sounds are normal. No tenderness. Musculoskeletal: Normal range of motion. He exhibits no edema and no tenderness. Neurological: He is alert and oriented to person, place, and time. Skin: Skin is warm and dry. Psychiatric: He has a normal mood and affect. His behavior is normal. Thought content normal.       Assessment:        Diagnosis Orders   1. Essential hypertension     2. Nonrheumatic aortic valve insufficiency     3. Non-rheumatic mitral regurgitation             Plan:      CV stable. Remains compensated. bp is good. . Remains active. No changes. Continue to monitor. Reviewed previous records and testing including echo 8/16. Follow up 4 months.

## 2019-11-22 ENCOUNTER — OFFICE VISIT (OUTPATIENT)
Dept: CARDIOLOGY CLINIC | Age: 84
End: 2019-11-22
Payer: MEDICARE

## 2019-11-22 VITALS
DIASTOLIC BLOOD PRESSURE: 70 MMHG | HEART RATE: 68 BPM | SYSTOLIC BLOOD PRESSURE: 130 MMHG | WEIGHT: 190 LBS | BODY MASS INDEX: 25.77 KG/M2

## 2019-11-22 DIAGNOSIS — I35.1 NONRHEUMATIC AORTIC VALVE INSUFFICIENCY: ICD-10-CM

## 2019-11-22 DIAGNOSIS — I34.0 NONRHEUMATIC MITRAL VALVE REGURGITATION: ICD-10-CM

## 2019-11-22 DIAGNOSIS — I10 ESSENTIAL HYPERTENSION: Primary | ICD-10-CM

## 2019-11-22 PROCEDURE — 99214 OFFICE O/P EST MOD 30 MIN: CPT | Performed by: INTERNAL MEDICINE

## 2019-12-11 RX ORDER — ATENOLOL 25 MG/1
TABLET ORAL
Qty: 30 TABLET | Refills: 5 | Status: SHIPPED | OUTPATIENT
Start: 2019-12-11 | End: 2020-06-09

## 2020-06-12 RX ORDER — ATENOLOL 25 MG/1
TABLET ORAL
Qty: 90 TABLET | Refills: 3 | Status: SHIPPED | OUTPATIENT
Start: 2020-06-12 | End: 2021-07-06

## 2020-07-29 ENCOUNTER — OFFICE VISIT (OUTPATIENT)
Dept: CARDIOLOGY CLINIC | Age: 85
End: 2020-07-29
Payer: MEDICARE

## 2020-07-29 VITALS
WEIGHT: 189 LBS | HEART RATE: 68 BPM | BODY MASS INDEX: 25.63 KG/M2 | SYSTOLIC BLOOD PRESSURE: 110 MMHG | DIASTOLIC BLOOD PRESSURE: 60 MMHG | TEMPERATURE: 97.6 F

## 2020-07-29 PROCEDURE — 99214 OFFICE O/P EST MOD 30 MIN: CPT | Performed by: INTERNAL MEDICINE

## 2020-07-29 NOTE — PROGRESS NOTES
Subjective:      Patient ID: Kevin Abdul is a 80 y.o. male. HPI:  Jasper Apt is being seen for  follow up for  HTN/AI/MR. No complaints. Keeps active  Exercising on stairs. No exertional sx. No edema. No pnd or orthopnea. No palp/tachy/syncope. Wt stable. No sob. bp good. Past Medical History:   Diagnosis Date    Aortic valve disorders     Essential hypertension     Hearing loss     Hyperlipidemia     Hypertension     Kidney stone     Mitral valve disorders(424.0)      Past Surgical History:   Procedure Laterality Date    ANKLE SURGERY      FRACTURE SURGERY      JOINT REPLACEMENT      MASTOID SURGERY      TOTAL KNEE ARTHROPLASTY         No Known Allergies     Social History     Socioeconomic History    Marital status:      Spouse name: Not on file    Number of children: 11    Years of education: Not on file    Highest education level: Not on file   Occupational History    Not on file   Social Needs    Financial resource strain: Not on file    Food insecurity     Worry: Not on file     Inability: Not on file    Transportation needs     Medical: Not on file     Non-medical: Not on file   Tobacco Use    Smoking status: Never Smoker    Smokeless tobacco: Never Used   Substance and Sexual Activity    Alcohol use:  Yes     Alcohol/week: 0.0 standard drinks     Comment: Occasionally    Drug use: No    Sexual activity: Not on file   Lifestyle    Physical activity     Days per week: Not on file     Minutes per session: Not on file    Stress: Not on file   Relationships    Social connections     Talks on phone: Not on file     Gets together: Not on file     Attends Anabaptist service: Not on file     Active member of club or organization: Not on file     Attends meetings of clubs or organizations: Not on file     Relationship status: Not on file    Intimate partner violence     Fear of current or ex partner: Not on file     Emotionally abused: Not on file Physically abused: Not on file     Forced sexual activity: Not on file   Other Topics Concern    Not on file   Social History Narrative    ** Merged History Encounter **             Patient has a family history includes Cancer in his father and mother. Patient  has a past medical history of Aortic valve disorders, Essential hypertension, Hearing loss, Hyperlipidemia, Hypertension, Kidney stone, and Mitral valve disorders(424.0). Current Outpatient Medications   Medication Sig Dispense Refill    atenolol (TENORMIN) 25 MG tablet TAKE 1 TABLET BY MOUTH DAILY. 90 tablet 3    atorvastatin (LIPITOR) 40 MG tablet Take 40 mg by mouth daily.  hydrochlorothiazide (HYDRODIURIL) 25 MG tablet Take 1 tablet by mouth daily. 30 tablet 6    potassium chloride (KLOR-CON) 20 MEQ packet Take 20 mEq by mouth twice a week.  aspirin 81 MG EC tablet Take 81 mg by mouth daily. No current facility-administered medications for this visit. Vitals:    07/29/20 1119   BP: 110/60   Pulse: 68   Temp: 97.6 °F (36.4 °C)       wt 189          Review of Systems   Constitutional: Negative for activity change and fatigue. Respiratory: Negative for apnea, cough, choking, chest tightness and shortness of breath. Cardiovascular: Negative for chest pain, palpitations and leg swelling. No PND or orthopnea. No tachycardia. Gastrointestinal: Negative for abdominal distention. Musculoskeletal: Negative for myalgias. Neurological: Negative for dizziness, syncope and light-headedness. Psychiatric/Behavioral: Negative for behavioral problems and confusion. All other systems reviewed negative as done. Objective:   Physical Exam   Constitutional: He is oriented to person, place, and time. He appears well-developed and well-nourished. No distress. HENT:   Head: Normocephalic and atraumatic. Eyes: Conjunctivae and EOM are normal. Right eye exhibits no discharge. Left eye exhibits no discharge.    Neck: Normal range of motion. Neck supple. No JVD present. Cardiovascular: Normal rate, regular rhythm, S1 normal and S2 normal.  Exam reveals no gallop. Murmur heard. Systolic murmur is present with a grade of 1/6   Pulses:       Radial pulses are 2+ on the right side, and 2+ on the left side. Pulmonary/Chest: Effort normal and breath sounds normal. No respiratory distress. He has no wheezes. He has no rales. Abdominal: Soft. Bowel sounds are normal. No tenderness. Musculoskeletal: Normal range of motion. He exhibits no edema and no tenderness. Neurological: He is alert and oriented to person, place, and time. Skin: Skin is warm and dry. Psychiatric: He has a normal mood and affect. His behavior is normal. Thought content normal.       Assessment:        Diagnosis Orders   1. Essential hypertension     2. Nonrheumatic aortic valve insufficiency     3. Nonrheumatic mitral valve regurgitation             Plan:      CV stable. Remains compensated. bp is good. . Remains active. Compensated. No changes. Continue to monitor. Reviewed previous records and testing including echo 8/16. Follow up 4 months.

## 2021-01-07 ENCOUNTER — OFFICE VISIT (OUTPATIENT)
Dept: CARDIOLOGY CLINIC | Age: 86
End: 2021-01-07
Payer: MEDICARE

## 2021-01-07 VITALS
TEMPERATURE: 96.9 F | DIASTOLIC BLOOD PRESSURE: 70 MMHG | SYSTOLIC BLOOD PRESSURE: 130 MMHG | WEIGHT: 189 LBS | BODY MASS INDEX: 25.63 KG/M2 | HEART RATE: 60 BPM

## 2021-01-07 DIAGNOSIS — I34.0 NONRHEUMATIC MITRAL VALVE REGURGITATION: ICD-10-CM

## 2021-01-07 DIAGNOSIS — I35.1 NONRHEUMATIC AORTIC VALVE INSUFFICIENCY: ICD-10-CM

## 2021-01-07 DIAGNOSIS — I10 ESSENTIAL HYPERTENSION: Primary | ICD-10-CM

## 2021-01-07 PROCEDURE — 99214 OFFICE O/P EST MOD 30 MIN: CPT | Performed by: INTERNAL MEDICINE

## 2021-01-07 RX ORDER — CALCIUM CARBONATE 500(1250)
TABLET ORAL
COMMUNITY

## 2021-01-07 NOTE — PROGRESS NOTES
Subjective:      Patient ID: Ulises Villafuerte is a 80 y.o. male. HPI:  Colby Vee is being seen for follow up for  HTN/AI/MR. No complaints. Keeps active  Exercising on stairs. No exertional sx. No edema. No pnd or orthopnea. No palp/tachy/syncope. Wt stable. No sob. bp good. Past Medical History:   Diagnosis Date    Aortic valve disorders     Essential hypertension     Hearing loss     Hyperlipidemia     Hypertension     Kidney stone     Mitral valve disorders(424.0)      Past Surgical History:   Procedure Laterality Date    ANKLE SURGERY      FRACTURE SURGERY      JOINT REPLACEMENT      MASTOID SURGERY      TOTAL KNEE ARTHROPLASTY         No Known Allergies     Social History     Socioeconomic History    Marital status:      Spouse name: Not on file    Number of children: 11    Years of education: Not on file    Highest education level: Not on file   Occupational History    Not on file   Social Needs    Financial resource strain: Not on file    Food insecurity     Worry: Not on file     Inability: Not on file    Transportation needs     Medical: Not on file     Non-medical: Not on file   Tobacco Use    Smoking status: Never Smoker    Smokeless tobacco: Never Used   Substance and Sexual Activity    Alcohol use:  Yes     Alcohol/week: 0.0 standard drinks     Comment: Occasionally    Drug use: No    Sexual activity: Not on file   Lifestyle    Physical activity     Days per week: Not on file     Minutes per session: Not on file    Stress: Not on file   Relationships    Social connections     Talks on phone: Not on file     Gets together: Not on file     Attends Baptism service: Not on file     Active member of club or organization: Not on file     Attends meetings of clubs or organizations: Not on file     Relationship status: Not on file    Intimate partner violence     Fear of current or ex partner: Not on file     Emotionally abused: Not on file Physically abused: Not on file     Forced sexual activity: Not on file   Other Topics Concern    Not on file   Social History Narrative    ** Merged History Encounter **             Patient has a family history includes Cancer in his father and mother. Patient  has a past medical history of Aortic valve disorders, Essential hypertension, Hearing loss, Hyperlipidemia, Hypertension, Kidney stone, and Mitral valve disorders(424.0). Current Outpatient Medications   Medication Sig Dispense Refill    atenolol (TENORMIN) 25 MG tablet TAKE 1 TABLET BY MOUTH DAILY. 90 tablet 3    atorvastatin (LIPITOR) 40 MG tablet Take 40 mg by mouth daily.  hydrochlorothiazide (HYDRODIURIL) 25 MG tablet Take 1 tablet by mouth daily. 30 tablet 6    potassium chloride (KLOR-CON) 20 MEQ packet Take 20 mEq by mouth twice a week.  aspirin 81 MG EC tablet Take 81 mg by mouth daily.  calcium carbonate (OSCAL) 500 MG TABS tablet Take by mouth       No current facility-administered medications for this visit. Vitals:    01/07/21 1043   BP: 130/70   Pulse: 60   Temp: 96.9 °F (36.1 °C)       wt 189          Review of Systems   Constitutional: Negative for activity change and fatigue. Respiratory: Negative for apnea, cough, choking, chest tightness and shortness of breath. Cardiovascular: Negative for chest pain, palpitations and leg swelling. No PND or orthopnea. No tachycardia. Gastrointestinal: Negative for abdominal distention. Musculoskeletal: Negative for myalgias. Neurological: Negative for dizziness, syncope and light-headedness. Psychiatric/Behavioral: Negative for behavioral problems and confusion. All other systems reviewed negative as done. Objective:   Physical Exam   Constitutional: He is oriented to person, place, and time. He appears well-developed and well-nourished. No distress. HENT:   Head: Normocephalic and atraumatic.    Eyes: Conjunctivae and EOM are normal. Right eye exhibits no discharge. Left eye exhibits no discharge. Neck: Normal range of motion. Neck supple. No JVD present. Cardiovascular: Normal rate, regular rhythm, S1 normal and S2 normal.  Exam reveals no gallop. Murmur heard. Systolic murmur is present with a grade of 1/6   Pulses:       Radial pulses are 2+ on the right side, and 2+ on the left side. Pulmonary/Chest: Effort normal and breath sounds normal. No respiratory distress. He has no wheezes. He has no rales. Abdominal: Soft. Bowel sounds are normal. No tenderness. Musculoskeletal: Normal range of motion. He exhibits no edema and no tenderness. Neurological: He is alert and oriented to person, place, and time. Skin: Skin is warm and dry. Psychiatric: He has a normal mood and affect. His behavior is normal. Thought content normal.       Assessment:        Diagnosis Orders   1. Essential hypertension     2. Nonrheumatic aortic valve insufficiency     3. Nonrheumatic mitral valve regurgitation             Plan:      CV stable. Remains compensated. bp is good. . Remains active. No changes. Continue to monitor. Reviewed previous records and testing including echo 8/16. Follow up 4 months.

## 2021-01-20 ENCOUNTER — IMMUNIZATION (OUTPATIENT)
Dept: PRIMARY CARE CLINIC | Age: 86
End: 2021-01-20
Payer: MEDICARE

## 2021-01-20 PROCEDURE — 0001A PR IMM ADMN SARSCOV2 30MCG/0.3ML DIL RECON 1ST DOSE: CPT | Performed by: FAMILY MEDICINE

## 2021-01-20 PROCEDURE — 91300 COVID-19, PFIZER VACCINE 30MCG/0.3ML DOSE: CPT | Performed by: FAMILY MEDICINE

## 2021-02-10 ENCOUNTER — IMMUNIZATION (OUTPATIENT)
Dept: PRIMARY CARE CLINIC | Age: 86
End: 2021-02-10
Payer: MEDICARE

## 2021-02-10 PROCEDURE — 91300 COVID-19, PFIZER VACCINE 30MCG/0.3ML DOSE: CPT | Performed by: FAMILY MEDICINE

## 2021-02-10 PROCEDURE — 0002A COVID-19, PFIZER VACCINE 30MCG/0.3ML DOSE: CPT | Performed by: FAMILY MEDICINE

## 2021-05-06 ENCOUNTER — OFFICE VISIT (OUTPATIENT)
Dept: CARDIOLOGY CLINIC | Age: 86
End: 2021-05-06
Payer: MEDICARE

## 2021-05-06 VITALS
HEART RATE: 56 BPM | BODY MASS INDEX: 25.23 KG/M2 | WEIGHT: 186 LBS | SYSTOLIC BLOOD PRESSURE: 122 MMHG | DIASTOLIC BLOOD PRESSURE: 70 MMHG

## 2021-05-06 DIAGNOSIS — I35.1 NONRHEUMATIC AORTIC VALVE INSUFFICIENCY: ICD-10-CM

## 2021-05-06 DIAGNOSIS — I10 ESSENTIAL HYPERTENSION: Primary | ICD-10-CM

## 2021-05-06 DIAGNOSIS — I34.0 NONRHEUMATIC MITRAL VALVE REGURGITATION: ICD-10-CM

## 2021-05-06 PROCEDURE — 99213 OFFICE O/P EST LOW 20 MIN: CPT | Performed by: INTERNAL MEDICINE

## 2021-05-06 NOTE — PROGRESS NOTES
Subjective:      Patient ID: Poonam Pappas is a 80 y.o. male. HPI:  Olivier Jalloh is being seen for follow up for  HTN/AI/MR. No complaints. Eaton Meza couple times. Keeps active  Exercising on stairs. No exertional sx. No edema. No pnd or orthopnea. No palp/tachy/syncope. Wt stable. No sob. bp good. Past Medical History:   Diagnosis Date    Aortic valve disorders     Essential hypertension     Hearing loss     Hyperlipidemia     Hypertension     Kidney stone     Mitral valve disorders(424.0)      Past Surgical History:   Procedure Laterality Date    ANKLE SURGERY      FRACTURE SURGERY      JOINT REPLACEMENT      MASTOID SURGERY      TOTAL KNEE ARTHROPLASTY         No Known Allergies     Social History     Socioeconomic History    Marital status:      Spouse name: Not on file    Number of children: 11    Years of education: Not on file    Highest education level: Not on file   Occupational History    Not on file   Social Needs    Financial resource strain: Not on file    Food insecurity     Worry: Not on file     Inability: Not on file    Transportation needs     Medical: Not on file     Non-medical: Not on file   Tobacco Use    Smoking status: Never Smoker    Smokeless tobacco: Never Used   Substance and Sexual Activity    Alcohol use:  Yes     Alcohol/week: 0.0 standard drinks     Comment: Occasionally    Drug use: No    Sexual activity: Not on file   Lifestyle    Physical activity     Days per week: Not on file     Minutes per session: Not on file    Stress: Not on file   Relationships    Social connections     Talks on phone: Not on file     Gets together: Not on file     Attends Mu-ism service: Not on file     Active member of club or organization: Not on file     Attends meetings of clubs or organizations: Not on file     Relationship status: Not on file    Intimate partner violence     Fear of current or ex partner: Not on file     Emotionally abused: Not on file     Physically abused: Not on file     Forced sexual activity: Not on file   Other Topics Concern    Not on file   Social History Narrative    ** Merged History Encounter **             Patient has a family history includes Cancer in his father and mother. Patient  has a past medical history of Aortic valve disorders, Essential hypertension, Hearing loss, Hyperlipidemia, Hypertension, Kidney stone, and Mitral valve disorders(424.0). Current Outpatient Medications   Medication Sig Dispense Refill    calcium carbonate (OSCAL) 500 MG TABS tablet Take by mouth      atenolol (TENORMIN) 25 MG tablet TAKE 1 TABLET BY MOUTH DAILY. 90 tablet 3    atorvastatin (LIPITOR) 40 MG tablet Take 40 mg by mouth daily.  hydrochlorothiazide (HYDRODIURIL) 25 MG tablet Take 1 tablet by mouth daily. 30 tablet 6    potassium chloride (KLOR-CON) 20 MEQ packet Take 20 mEq by mouth twice a week.  aspirin 81 MG EC tablet Take 81 mg by mouth daily. No current facility-administered medications for this visit. Vitals:    05/06/21 1041   BP: 122/70   Pulse: 56       wt 186          Review of Systems   Constitutional: Negative for activity change and fatigue. Respiratory: Negative for apnea, cough, choking, chest tightness and shortness of breath. Cardiovascular: Negative for chest pain, palpitations and leg swelling. No PND or orthopnea. No tachycardia. Gastrointestinal: Negative for abdominal distention. Musculoskeletal: Negative for myalgias. Neurological: Negative for dizziness, syncope and light-headedness. Psychiatric/Behavioral: Negative for behavioral problems and confusion. All other systems reviewed negative as done. Objective:   Physical Exam   Constitutional: He is oriented to person, place, and time. He appears well-developed and well-nourished. No distress. HENT:   Head: Normocephalic and atraumatic.    Eyes: Conjunctivae and EOM are normal. Right eye exhibits no discharge. Left eye exhibits no discharge. Neck: Normal range of motion. Neck supple. No JVD present. Cardiovascular: Normal rate, regular rhythm, S1 normal and S2 normal.  Exam reveals no gallop. Murmur heard. Systolic murmur is present with a grade of 1/6   Pulses:       Radial pulses are 2+ on the right side, and 2+ on the left side. Pulmonary/Chest: Effort normal and breath sounds normal. No respiratory distress. He has no wheezes. He has no rales. Abdominal: Soft. Bowel sounds are normal. No tenderness. Musculoskeletal: Normal range of motion. He exhibits no edema and no tenderness. Neurological: He is alert and oriented to person, place, and time. Skin: Skin is warm and dry. Psychiatric: He has a normal mood and affect. His behavior is normal. Thought content normal.       Assessment:        Diagnosis Orders   1. Essential hypertension     2. Nonrheumatic aortic valve insufficiency     3. Nonrheumatic mitral valve regurgitation             Plan:      CV stable. Remains compensated. bp is good. . Remains active. No changes. Continue to monitor. Reviewed previous records and testing including echo 8/16. Follow up 4 months.

## 2021-07-06 RX ORDER — ATENOLOL 25 MG/1
TABLET ORAL
Qty: 90 TABLET | Refills: 3 | Status: SHIPPED | OUTPATIENT
Start: 2021-07-06 | End: 2022-07-11

## 2021-07-06 NOTE — TELEPHONE ENCOUNTER
Requested Prescriptions     Pending Prescriptions Disp Refills    atenolol (TENORMIN) 25 MG tablet [Pharmacy Med Name: ATENOLOL 25 MG TABLET] 90 tablet 3     Sig: TAKE 1 TABLET BY MOUTH EVERY DAY                Last Office Visit: 5/6/2021     Next Office Visit: 9/9/2021

## 2021-09-09 ENCOUNTER — OFFICE VISIT (OUTPATIENT)
Dept: CARDIOLOGY CLINIC | Age: 86
End: 2021-09-09
Payer: MEDICARE

## 2021-09-09 VITALS
WEIGHT: 186 LBS | HEART RATE: 60 BPM | BODY MASS INDEX: 25.23 KG/M2 | SYSTOLIC BLOOD PRESSURE: 114 MMHG | DIASTOLIC BLOOD PRESSURE: 70 MMHG

## 2021-09-09 DIAGNOSIS — I10 ESSENTIAL HYPERTENSION: Primary | ICD-10-CM

## 2021-09-09 DIAGNOSIS — I34.0 NONRHEUMATIC MITRAL VALVE REGURGITATION: ICD-10-CM

## 2021-09-09 DIAGNOSIS — I35.1 NONRHEUMATIC AORTIC VALVE INSUFFICIENCY: ICD-10-CM

## 2021-09-09 PROCEDURE — 99213 OFFICE O/P EST LOW 20 MIN: CPT | Performed by: INTERNAL MEDICINE

## 2021-09-09 NOTE — PROGRESS NOTES
Subjective:      Patient ID: Deepthi Mckinney is a 80 y.o. male. HPI:  Patricia Qureshi is being seen for follow up for  HTN/AI/MR. No complaints. Keeps active  Exercising on stairs. No exertional sx. No edema. No pnd or orthopnea. No palp/tachy/syncope. Wt stable at home. No sob. bp good. Past Medical History:   Diagnosis Date    Aortic valve disorders     Essential hypertension     Hearing loss     Hyperlipidemia     Hypertension     Kidney stone     Mitral valve disorders(424.0)      Past Surgical History:   Procedure Laterality Date    ANKLE SURGERY      FRACTURE SURGERY      JOINT REPLACEMENT      MASTOID SURGERY      TOTAL KNEE ARTHROPLASTY         No Known Allergies     Social History     Socioeconomic History    Marital status:      Spouse name: Not on file    Number of children: 11    Years of education: Not on file    Highest education level: Not on file   Occupational History    Not on file   Tobacco Use    Smoking status: Never Smoker    Smokeless tobacco: Never Used   Substance and Sexual Activity    Alcohol use: Yes     Alcohol/week: 0.0 standard drinks     Comment: Occasionally    Drug use: No    Sexual activity: Not on file   Other Topics Concern    Not on file   Social History Narrative    ** Merged History Encounter **          Social Determinants of Health     Financial Resource Strain:     Difficulty of Paying Living Expenses:    Food Insecurity:     Worried About Running Out of Food in the Last Year:     Ran Out of Food in the Last Year:    Transportation Needs:     Lack of Transportation (Medical):      Lack of Transportation (Non-Medical):    Physical Activity:     Days of Exercise per Week:     Minutes of Exercise per Session:    Stress:     Feeling of Stress :    Social Connections:     Frequency of Communication with Friends and Family:     Frequency of Social Gatherings with Friends and Family:     Attends Confucianism Services:     Active Member of Clubs or Organizations:     Attends Club or Organization Meetings:     Marital Status:    Intimate Partner Violence:     Fear of Current or Ex-Partner:     Emotionally Abused:     Physically Abused:     Sexually Abused:         Patient has a family history includes Cancer in his father and mother. Patient  has a past medical history of Aortic valve disorders, Essential hypertension, Hearing loss, Hyperlipidemia, Hypertension, Kidney stone, and Mitral valve disorders(424.0). Current Outpatient Medications   Medication Sig Dispense Refill    atenolol (TENORMIN) 25 MG tablet TAKE 1 TABLET BY MOUTH EVERY DAY 90 tablet 3    calcium carbonate (OSCAL) 500 MG TABS tablet Take by mouth      atorvastatin (LIPITOR) 40 MG tablet Take 40 mg by mouth daily.  hydrochlorothiazide (HYDRODIURIL) 25 MG tablet Take 1 tablet by mouth daily. 30 tablet 6    potassium chloride (KLOR-CON) 20 MEQ packet Take 20 mEq by mouth twice a week.  aspirin 81 MG EC tablet Take 81 mg by mouth daily. No current facility-administered medications for this visit. Vitals:    09/09/21 1040   BP: 114/70   Pulse: 60       wt 190          Review of Systems   Constitutional: Negative for activity change and fatigue. Respiratory: Negative for apnea, cough, choking, chest tightness and shortness of breath. Cardiovascular: Negative for chest pain, palpitations and leg swelling. No PND or orthopnea. No tachycardia. Gastrointestinal: Negative for abdominal distention. Musculoskeletal: Negative for myalgias. Neurological: Negative for dizziness, syncope and light-headedness. Psychiatric/Behavioral: Negative for behavioral problems and confusion. All other systems reviewed negative as done. Objective:   Physical Exam   Constitutional: He is oriented to person, place, and time. He appears well-developed and well-nourished. No distress. HENT:   Head: Normocephalic and atraumatic.

## 2022-01-20 ENCOUNTER — OFFICE VISIT (OUTPATIENT)
Dept: CARDIOLOGY CLINIC | Age: 87
End: 2022-01-20
Payer: MEDICARE

## 2022-01-20 VITALS
OXYGEN SATURATION: 96 % | HEART RATE: 66 BPM | WEIGHT: 186 LBS | SYSTOLIC BLOOD PRESSURE: 110 MMHG | HEIGHT: 70 IN | BODY MASS INDEX: 26.63 KG/M2 | DIASTOLIC BLOOD PRESSURE: 66 MMHG

## 2022-01-20 DIAGNOSIS — I10 ESSENTIAL HYPERTENSION: Primary | ICD-10-CM

## 2022-01-20 DIAGNOSIS — I35.1 NONRHEUMATIC AORTIC VALVE INSUFFICIENCY: ICD-10-CM

## 2022-01-20 DIAGNOSIS — I34.0 NONRHEUMATIC MITRAL VALVE REGURGITATION: ICD-10-CM

## 2022-01-20 PROCEDURE — 99214 OFFICE O/P EST MOD 30 MIN: CPT | Performed by: INTERNAL MEDICINE

## 2022-01-20 NOTE — PROGRESS NOTES
Subjective:      Patient ID: Theresa Estrella is a 80 y.o. male. HPI:  Tasha Cuevas is being seen for follow up for  HTN/AI/MR. No complaints. Keeps active  Exercising. No exertional sx. No edema. No pnd or orthopnea. No palp/tachy/syncope. Wt stable at home. No sob. bp good. Past Medical History:   Diagnosis Date    Aortic valve disorders     Essential hypertension     Hearing loss     Hyperlipidemia     Hypertension     Kidney stone     Mitral valve disorders(424.0)      Past Surgical History:   Procedure Laterality Date    ANKLE SURGERY      FRACTURE SURGERY      JOINT REPLACEMENT      MASTOID SURGERY      TOTAL KNEE ARTHROPLASTY         No Known Allergies     Social History     Socioeconomic History    Marital status:      Spouse name: Not on file    Number of children: 11    Years of education: Not on file    Highest education level: Not on file   Occupational History    Not on file   Tobacco Use    Smoking status: Never Smoker    Smokeless tobacco: Never Used   Substance and Sexual Activity    Alcohol use: Yes     Alcohol/week: 0.0 standard drinks     Comment: Occasionally    Drug use: No    Sexual activity: Not on file   Other Topics Concern    Not on file   Social History Narrative    ** Merged History Encounter **          Social Determinants of Health     Financial Resource Strain:     Difficulty of Paying Living Expenses: Not on file   Food Insecurity:     Worried About Running Out of Food in the Last Year: Not on file    Brock of Food in the Last Year: Not on file   Transportation Needs:     Lack of Transportation (Medical): Not on file    Lack of Transportation (Non-Medical):  Not on file   Physical Activity:     Days of Exercise per Week: Not on file    Minutes of Exercise per Session: Not on file   Stress:     Feeling of Stress : Not on file   Social Connections:     Frequency of Communication with Friends and Family: Not on file    Frequency of Social Gatherings with Friends and Family: Not on file    Attends Mormon Services: Not on file    Active Member of Clubs or Organizations: Not on file    Attends Club or Organization Meetings: Not on file    Marital Status: Not on file   Intimate Partner Violence:     Fear of Current or Ex-Partner: Not on file    Emotionally Abused: Not on file    Physically Abused: Not on file    Sexually Abused: Not on file   Housing Stability:     Unable to Pay for Housing in the Last Year: Not on file    Number of [place] in the Last Year: Not on file    Unstable Housing in the Last Year: Not on file        Patient has a family history includes Cancer in his father and mother. Patient  has a past medical history of Aortic valve disorders, Essential hypertension, Hearing loss, Hyperlipidemia, Hypertension, Kidney stone, and Mitral valve disorders(424.0). Current Outpatient Medications   Medication Sig Dispense Refill    atenolol (TENORMIN) 25 MG tablet TAKE 1 TABLET BY MOUTH EVERY DAY 90 tablet 3    calcium carbonate (OSCAL) 500 MG TABS tablet Take by mouth      atorvastatin (LIPITOR) 40 MG tablet Take 40 mg by mouth daily.  hydrochlorothiazide (HYDRODIURIL) 25 MG tablet Take 1 tablet by mouth daily. 30 tablet 6    potassium chloride (KLOR-CON) 20 MEQ packet Take 20 mEq by mouth twice a week.  aspirin 81 MG EC tablet Take 81 mg by mouth daily. No current facility-administered medications for this visit. Vitals:    01/20/22 1015   Pulse: 66   SpO2: 96%       wt 190          Review of Systems   Constitutional: Negative for activity change and fatigue. Respiratory: Negative for apnea, cough, choking, chest tightness and shortness of breath. Cardiovascular: Negative for chest pain, palpitations and leg swelling. No PND or orthopnea. No tachycardia. Gastrointestinal: Negative for abdominal distention. Musculoskeletal: Negative for myalgias. Neurological: Negative for dizziness, syncope and light-headedness. Psychiatric/Behavioral: Negative for behavioral problems and confusion. All other systems reviewed negative as done. Objective:   Physical Exam   Constitutional: He is oriented to person, place, and time. He appears well-developed and well-nourished. No distress. HENT:   Head: Normocephalic and atraumatic. Eyes: Conjunctivae and EOM are normal. Right eye exhibits no discharge. Left eye exhibits no discharge. Neck: Normal range of motion. Neck supple. No JVD present. Cardiovascular: Normal rate, regular rhythm, S1 normal and S2 normal.  Exam reveals no gallop. Murmur heard. Systolic murmur is present with a grade of 1/6   Pulses:       Radial pulses are 2+ on the right side, and 2+ on the left side. Pulmonary/Chest: Effort normal and breath sounds normal. No respiratory distress. He has no wheezes. He has no rales. Abdominal: Soft. Bowel sounds are normal. No tenderness. Musculoskeletal: Normal range of motion. He exhibits no edema and no tenderness. Neurological: He is alert and oriented to person, place, and time. Skin: Skin is warm and dry. Psychiatric: He has a normal mood and affect. His behavior is normal. Thought content normal.       Assessment:      Diagnosis Orders   1. Essential hypertension     2. Nonrheumatic aortic valve insufficiency     3. Nonrheumatic mitral valve regurgitation           Plan:      CV stable. Remains compensated. bp is good. . Remains active. No edema. No changes. Continue to monitor. Reviewed previous records and testing including echo 8/16. Follow up 4 months.

## 2022-07-11 RX ORDER — ATENOLOL 25 MG/1
TABLET ORAL
Qty: 90 TABLET | Refills: 3 | Status: SHIPPED | OUTPATIENT
Start: 2022-07-11

## 2022-07-11 NOTE — TELEPHONE ENCOUNTER
Requested Prescriptions     Pending Prescriptions Disp Refills    atenolol (TENORMIN) 25 MG tablet [Pharmacy Med Name: ATENOLOL 25 MG TABLET] 90 tablet 3     Sig: TAKE 1 TABLET BY MOUTH EVERY DAY                  Last Office Visit: 9/9/2021     Next Office Visit: 8/3/2022

## 2022-08-03 ENCOUNTER — OFFICE VISIT (OUTPATIENT)
Dept: CARDIOLOGY CLINIC | Age: 87
End: 2022-08-03
Payer: MEDICARE

## 2022-08-03 VITALS
SYSTOLIC BLOOD PRESSURE: 108 MMHG | WEIGHT: 182 LBS | BODY MASS INDEX: 26.11 KG/M2 | HEART RATE: 61 BPM | DIASTOLIC BLOOD PRESSURE: 64 MMHG

## 2022-08-03 DIAGNOSIS — I35.1 NONRHEUMATIC AORTIC VALVE INSUFFICIENCY: ICD-10-CM

## 2022-08-03 DIAGNOSIS — I34.0 NONRHEUMATIC MITRAL VALVE REGURGITATION: ICD-10-CM

## 2022-08-03 DIAGNOSIS — I10 ESSENTIAL HYPERTENSION: Primary | ICD-10-CM

## 2022-08-03 PROCEDURE — 1123F ACP DISCUSS/DSCN MKR DOCD: CPT | Performed by: INTERNAL MEDICINE

## 2022-08-03 PROCEDURE — 99214 OFFICE O/P EST MOD 30 MIN: CPT | Performed by: INTERNAL MEDICINE

## 2022-08-03 NOTE — PROGRESS NOTES
BY MOUTH EVERY DAY 90 tablet 3    calcium carbonate (OSCAL) 500 MG TABS tablet Take by mouth      atorvastatin (LIPITOR) 40 MG tablet Take 40 mg by mouth daily. hydrochlorothiazide (HYDRODIURIL) 25 MG tablet Take 1 tablet by mouth daily. 30 tablet 6    potassium chloride (KLOR-CON) 20 MEQ packet Take 20 mEq by mouth twice a week. aspirin 81 MG EC tablet Take 81 mg by mouth daily. No current facility-administered medications for this visit. Vitals:    08/03/22 1404   BP: 108/64   Pulse: 61         wt 182          Review of Systems   Constitutional: Negative for activity change and fatigue. Respiratory: Negative for apnea, cough, choking, chest tightness and shortness of breath. Cardiovascular: Negative for chest pain, palpitations and leg swelling. No PND or orthopnea. No tachycardia. Gastrointestinal: Negative for abdominal distention. Musculoskeletal: Negative for myalgias. Neurological: Negative for dizziness, syncope and light-headedness. Psychiatric/Behavioral: Negative for behavioral problems and confusion. All other systems reviewed negative as done. Objective:   Physical Exam   Constitutional: He is oriented to person, place, and time. He appears well-developed and well-nourished. No distress. HENT:   Head: Normocephalic and atraumatic. Eyes: Conjunctivae and EOM are normal. Right eye exhibits no discharge. Left eye exhibits no discharge. Neck: Normal range of motion. Neck supple. No JVD present. Cardiovascular: Normal rate, regular rhythm, S1 normal and S2 normal.  Exam reveals no gallop. Murmur heard. Systolic murmur is present with a grade of 1/6   Pulses:       Radial pulses are 2+ on the right side, and 2+ on the left side. Pulmonary/Chest: Effort normal and breath sounds normal. No respiratory distress. He has no wheezes. He has no rales. Abdominal: Soft. Bowel sounds are normal. No tenderness.    Musculoskeletal: Normal range of motion. He exhibits no edema and no tenderness. Neurological: He is alert and oriented to person, place, and time. Skin: Skin is warm and dry. Psychiatric: He has a normal mood and affect. His behavior is normal. Thought content normal.       Assessment:      Diagnosis Orders   1. Essential hypertension        2. Nonrheumatic aortic valve insufficiency        3. Nonrheumatic mitral valve regurgitation                  Plan:      CV stable. will continue atenolol/HCTZ/ASA/Lipitor. No changes. Continue to monitor. Reviewed previous records and testing including echo 8/16. Follow up 4 months.

## 2022-12-22 ENCOUNTER — OFFICE VISIT (OUTPATIENT)
Dept: CARDIOLOGY CLINIC | Age: 87
End: 2022-12-22
Payer: MEDICARE

## 2022-12-22 VITALS
HEART RATE: 60 BPM | WEIGHT: 197 LBS | DIASTOLIC BLOOD PRESSURE: 70 MMHG | BODY MASS INDEX: 28.27 KG/M2 | SYSTOLIC BLOOD PRESSURE: 118 MMHG

## 2022-12-22 DIAGNOSIS — I34.0 NONRHEUMATIC MITRAL VALVE REGURGITATION: ICD-10-CM

## 2022-12-22 DIAGNOSIS — I35.1 NONRHEUMATIC AORTIC VALVE INSUFFICIENCY: ICD-10-CM

## 2022-12-22 DIAGNOSIS — I10 ESSENTIAL HYPERTENSION: Primary | ICD-10-CM

## 2022-12-22 PROCEDURE — 99214 OFFICE O/P EST MOD 30 MIN: CPT | Performed by: INTERNAL MEDICINE

## 2022-12-22 PROCEDURE — 1123F ACP DISCUSS/DSCN MKR DOCD: CPT | Performed by: INTERNAL MEDICINE

## 2022-12-22 NOTE — PROGRESS NOTES
Subjective:      Patient ID: Sandy Trinh is a 80 y.o. male. HPI:  Kim Petty is being seen for follow up for  HTN/AI/MR. No complaints. Keeps active  Exercising. No exertional sx. No edema. No pnd or orthopnea. No palp/tachy/syncope. Wt stable at home. No sob. bp good. Past Medical History:   Diagnosis Date    Aortic valve disorders     Essential hypertension     Hearing loss     Hyperlipidemia     Hypertension     Kidney stone     Mitral valve disorders(424.0)      Past Surgical History:   Procedure Laterality Date    ANKLE SURGERY      FRACTURE SURGERY      JOINT REPLACEMENT      MASTOID SURGERY      TOTAL KNEE ARTHROPLASTY         No Known Allergies     Social History     Socioeconomic History    Marital status:      Spouse name: Not on file    Number of children: 5    Years of education: Not on file    Highest education level: Not on file   Occupational History    Not on file   Tobacco Use    Smoking status: Never    Smokeless tobacco: Never   Substance and Sexual Activity    Alcohol use: Yes     Alcohol/week: 0.0 standard drinks     Comment: Occasionally    Drug use: No    Sexual activity: Not on file   Other Topics Concern    Not on file   Social History Narrative    ** Merged History Encounter **          Social Determinants of Health     Financial Resource Strain: Not on file   Food Insecurity: Not on file   Transportation Needs: Not on file   Physical Activity: Not on file   Stress: Not on file   Social Connections: Not on file   Intimate Partner Violence: Not on file   Housing Stability: Not on file        Patient has a family history includes Cancer in his father and mother. Patient  has a past medical history of Aortic valve disorders, Essential hypertension, Hearing loss, Hyperlipidemia, Hypertension, Kidney stone, and Mitral valve disorders(424.0).      Current Outpatient Medications   Medication Sig Dispense Refill    atenolol (TENORMIN) 25 MG tablet TAKE 1 TABLET BY MOUTH EVERY DAY 90 tablet 3    calcium carbonate (OSCAL) 500 MG TABS tablet Take by mouth      atorvastatin (LIPITOR) 40 MG tablet Take 40 mg by mouth daily. hydrochlorothiazide (HYDRODIURIL) 25 MG tablet Take 1 tablet by mouth daily. 30 tablet 6    potassium chloride (KLOR-CON) 20 MEQ packet Take 20 mEq by mouth twice a week. aspirin 81 MG EC tablet Take 81 mg by mouth daily. No current facility-administered medications for this visit. Vitals:    12/22/22 1039   BP: 118/70   Pulse: 60         wt 194          Review of Systems   Constitutional: Negative for activity change and fatigue. Respiratory: Negative for apnea, cough, choking, chest tightness and shortness of breath. Cardiovascular: Negative for chest pain, palpitations and leg swelling. No PND or orthopnea. No tachycardia. Gastrointestinal: Negative for abdominal distention. Musculoskeletal: Negative for myalgias. Neurological: Negative for dizziness, syncope and light-headedness. Psychiatric/Behavioral: Negative for behavioral problems and confusion. All other systems reviewed negative as done. Objective:   Physical Exam   Constitutional: He is oriented to person, place, and time. He appears well-developed and well-nourished. No distress. HENT:   Head: Normocephalic and atraumatic. Eyes: Conjunctivae and EOM are normal. Right eye exhibits no discharge. Left eye exhibits no discharge. Neck: Normal range of motion. Neck supple. No JVD present. Cardiovascular: Normal rate, regular rhythm, S1 normal and S2 normal.  Exam reveals no gallop. Murmur heard. Systolic murmur is present with a grade of 1/6   Pulses:       Radial pulses are 2+ on the right side, and 2+ on the left side. Pulmonary/Chest: Effort normal and breath sounds normal. No respiratory distress. He has no wheezes. He has no rales. Abdominal: Soft. Bowel sounds are normal. No tenderness.    Musculoskeletal: Normal range of motion. He exhibits no edema and no tenderness. Neurological: He is alert and oriented to person, place, and time. Skin: Skin is warm and dry. Psychiatric: He has a normal mood and affect. His behavior is normal. Thought content normal.       Assessment:      Diagnosis Orders   1. Essential hypertension        2. Nonrheumatic aortic valve insufficiency        3. Nonrheumatic mitral valve regurgitation                Plan:      CV stable. Will continue atenolol/HCTZ/ASA/Lipitor. No changes. Continue to monitor. Reviewed previous records and testing including echo 8/16. Follow up 4 months.

## 2023-03-16 ENCOUNTER — OFFICE VISIT (OUTPATIENT)
Dept: CARDIOLOGY CLINIC | Age: 88
End: 2023-03-16
Payer: MEDICARE

## 2023-03-16 VITALS
SYSTOLIC BLOOD PRESSURE: 120 MMHG | WEIGHT: 200 LBS | DIASTOLIC BLOOD PRESSURE: 64 MMHG | HEART RATE: 60 BPM | BODY MASS INDEX: 28.7 KG/M2

## 2023-03-16 DIAGNOSIS — I34.0 NONRHEUMATIC MITRAL VALVE REGURGITATION: ICD-10-CM

## 2023-03-16 DIAGNOSIS — R06.02 SOB (SHORTNESS OF BREATH): Primary | ICD-10-CM

## 2023-03-16 DIAGNOSIS — I10 ESSENTIAL HYPERTENSION: ICD-10-CM

## 2023-03-16 DIAGNOSIS — I35.1 NONRHEUMATIC AORTIC VALVE INSUFFICIENCY: ICD-10-CM

## 2023-03-16 PROCEDURE — 99214 OFFICE O/P EST MOD 30 MIN: CPT | Performed by: INTERNAL MEDICINE

## 2023-03-16 PROCEDURE — 93000 ELECTROCARDIOGRAM COMPLETE: CPT | Performed by: INTERNAL MEDICINE

## 2023-03-16 PROCEDURE — 1123F ACP DISCUSS/DSCN MKR DOCD: CPT | Performed by: INTERNAL MEDICINE

## 2023-03-16 NOTE — PROGRESS NOTES
Subjective:      Patient ID: Dora Ramos is a 80 y.o. male. HPI:  Dasia Hobson is being seen for follow up for  HTN/AI/MR. Some sob. When getting ready. When walking for while. Not real active. In house. No edema. No pnd or orthopnea. No palp/tachy/syncope. Wt up 5 lbs now. bp good. Past Medical History:   Diagnosis Date    Aortic valve disorders     Essential hypertension     Hearing loss     Hyperlipidemia     Hypertension     Kidney stone     Mitral valve disorders(424.0)      Past Surgical History:   Procedure Laterality Date    ANKLE SURGERY      FRACTURE SURGERY      JOINT REPLACEMENT      MASTOID SURGERY      TOTAL KNEE ARTHROPLASTY         No Known Allergies     Social History     Socioeconomic History    Marital status:      Spouse name: Not on file    Number of children: 5    Years of education: Not on file    Highest education level: Not on file   Occupational History    Not on file   Tobacco Use    Smoking status: Never    Smokeless tobacco: Never   Substance and Sexual Activity    Alcohol use: Yes     Alcohol/week: 0.0 standard drinks     Comment: Occasionally    Drug use: No    Sexual activity: Not on file   Other Topics Concern    Not on file   Social History Narrative    ** Merged History Encounter **          Social Determinants of Health     Financial Resource Strain: Not on file   Food Insecurity: Not on file   Transportation Needs: Not on file   Physical Activity: Not on file   Stress: Not on file   Social Connections: Not on file   Intimate Partner Violence: Not on file   Housing Stability: Not on file        Patient has a family history includes Cancer in his father and mother. Patient  has a past medical history of Aortic valve disorders, Essential hypertension, Hearing loss, Hyperlipidemia, Hypertension, Kidney stone, and Mitral valve disorders(424.0).      Current Outpatient Medications   Medication Sig Dispense Refill    atenolol (TENORMIN) 25 MG tablet TAKE 1 TABLET BY MOUTH EVERY DAY 90 tablet 3    calcium carbonate (OSCAL) 500 MG TABS tablet Take by mouth      atorvastatin (LIPITOR) 40 MG tablet Take 40 mg by mouth daily. hydrochlorothiazide (HYDRODIURIL) 25 MG tablet Take 1 tablet by mouth daily. 30 tablet 6    potassium chloride (KLOR-CON) 20 MEQ packet Take 20 mEq by mouth twice a week. aspirin 81 MG EC tablet Take 81 mg by mouth daily. No current facility-administered medications for this visit. Vitals:    03/16/23 1045   BP: 120/64   Pulse: 60         wt 200 ( up 6 lbs)          Review of Systems   Constitutional: Negative for activity change and fatigue. Respiratory: Negative for apnea, cough, choking, chest tightness and shortness of breath. Cardiovascular: Negative for chest pain, palpitations and leg swelling. No PND or orthopnea. No tachycardia. Gastrointestinal: Negative for abdominal distention. Musculoskeletal: Negative for myalgias. Neurological: Negative for dizziness, syncope and light-headedness. Psychiatric/Behavioral: Negative for behavioral problems and confusion. All other systems reviewed negative as done. Objective:   Physical Exam   Constitutional: He is oriented to person, place, and time. He appears well-developed and well-nourished. No distress. HENT:   Head: Normocephalic and atraumatic. Eyes: Conjunctivae and EOM are normal. Right eye exhibits no discharge. Left eye exhibits no discharge. Neck: Normal range of motion. Neck supple. No JVD present. Cardiovascular: Normal rate, regular rhythm, S1 normal and S2 normal.  Exam reveals no gallop. Murmur heard. Systolic murmur is present with a grade of 1/6   Pulses:       Radial pulses are 2+ on the right side, and 2+ on the left side. Pulmonary/Chest: Effort normal and breath sounds normal. No respiratory distress. He has no wheezes. He has no rales. Abdominal: Soft. Bowel sounds are normal. No tenderness. Musculoskeletal: Normal range of motion. He exhibits tr edema and no tenderness. Neurological: He is alert and oriented to person, place, and time. Skin: Skin is warm and dry. Psychiatric: He has a normal mood and affect. His behavior is normal. Thought content normal.       Assessment:      Diagnosis Orders   1. SOB (shortness of breath)        2. Essential hypertension        3. Nonrheumatic aortic valve insufficiency        4. Nonrheumatic mitral valve regurgitation                Plan:      Some REGAN. Relatively recent onset. Not real active. Wt is up 6 lbs. Tr edema but lung fields clear. BP good. No chest pain. 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 Will continue atenolol/HCTZ/ASA/Lipitor. Will have echo. Check BNP/cbc/EP1. .  CXR. Labs ok in 2/23. No changes. Continue to monitor. Reviewed previous records and testing including echo 8/16. Follow up 6-8 wks.

## 2023-04-27 ENCOUNTER — HOSPITAL ENCOUNTER (OUTPATIENT)
Dept: NON INVASIVE DIAGNOSTICS | Age: 88
Discharge: HOME | End: 2023-04-27
Payer: MEDICARE

## 2023-04-27 ENCOUNTER — HOSPITAL ENCOUNTER (OUTPATIENT)
Age: 88
Discharge: HOME | End: 2023-04-27
Payer: MEDICARE

## 2023-04-27 ENCOUNTER — HOSPITAL ENCOUNTER (OUTPATIENT)
Dept: GENERAL RADIOLOGY | Age: 88
Discharge: HOME | End: 2023-04-27
Payer: MEDICARE

## 2023-04-27 DIAGNOSIS — I34.0 NONRHEUMATIC MITRAL VALVE REGURGITATION: ICD-10-CM

## 2023-04-27 DIAGNOSIS — R06.02 SOB (SHORTNESS OF BREATH): ICD-10-CM

## 2023-04-27 DIAGNOSIS — I10 ESSENTIAL HYPERTENSION: ICD-10-CM

## 2023-04-27 DIAGNOSIS — I35.1 NONRHEUMATIC AORTIC VALVE INSUFFICIENCY: ICD-10-CM

## 2023-04-27 LAB
BASOPHILS # BLD: 0.1 K/UL (ref 0–0.2)
BASOPHILS NFR BLD: 0.8 %
DEPRECATED RDW RBC AUTO: 17.1 % (ref 12.4–15.4)
EOSINOPHIL # BLD: 0.3 K/UL (ref 0–0.6)
EOSINOPHIL NFR BLD: 2.7 %
HCT VFR BLD AUTO: 47.7 % (ref 40.5–52.5)
HGB BLD-MCNC: 15.2 G/DL (ref 13.5–17.5)
LV EF: 60 %
LVEF MODALITY: NORMAL
LYMPHOCYTES # BLD: 1.6 K/UL (ref 1–5.1)
LYMPHOCYTES NFR BLD: 16 %
MCH RBC QN AUTO: 29.1 PG (ref 26–34)
MCHC RBC AUTO-ENTMCNC: 31.8 G/DL (ref 31–36)
MCV RBC AUTO: 91.7 FL (ref 80–100)
MONOCYTES # BLD: 0.8 K/UL (ref 0–1.3)
MONOCYTES NFR BLD: 8.3 %
NEUTROPHILS # BLD: 7.2 K/UL (ref 1.7–7.7)
NEUTROPHILS NFR BLD: 72.2 %
PLATELET # BLD AUTO: 100 K/UL (ref 135–450)
PMV BLD AUTO: 9.1 FL (ref 5–10.5)
RBC # BLD AUTO: 5.2 M/UL (ref 4.2–5.9)
WBC # BLD AUTO: 10 K/UL (ref 4–11)

## 2023-04-27 PROCEDURE — 83880 ASSAY OF NATRIURETIC PEPTIDE: CPT

## 2023-04-27 PROCEDURE — 85025 COMPLETE CBC W/AUTO DIFF WBC: CPT

## 2023-04-27 PROCEDURE — 74022 RADEX COMPL AQT ABD SERIES: CPT

## 2023-04-27 PROCEDURE — 80048 BASIC METABOLIC PNL TOTAL CA: CPT

## 2023-04-27 PROCEDURE — 36415 COLL VENOUS BLD VENIPUNCTURE: CPT

## 2023-04-27 PROCEDURE — 93306 TTE W/DOPPLER COMPLETE: CPT

## 2023-04-29 LAB
ANION GAP SERPL CALCULATED.3IONS-SCNC: 10 MMOL/L (ref 3–16)
BUN SERPL-MCNC: 34 MG/DL (ref 7–20)
CALCIUM SERPL-MCNC: 9.7 MG/DL (ref 8.3–10.6)
CHLORIDE SERPL-SCNC: 101 MMOL/L (ref 99–110)
CO2 SERPL-SCNC: 31 MMOL/L (ref 21–32)
CREAT SERPL-MCNC: 1.2 MG/DL (ref 0.8–1.3)
GFR SERPLBLD CREATININE-BSD FMLA CKD-EPI: 55 ML/MIN/{1.73_M2}
GLUCOSE SERPL-MCNC: 125 MG/DL (ref 70–99)
NT-PROBNP SERPL-MCNC: 175 PG/ML (ref 0–449)
POTASSIUM SERPL-SCNC: 4.1 MMOL/L (ref 3.5–5.1)
SODIUM SERPL-SCNC: 142 MMOL/L (ref 136–145)

## 2023-05-04 ENCOUNTER — OFFICE VISIT (OUTPATIENT)
Dept: CARDIOLOGY CLINIC | Age: 88
End: 2023-05-04
Payer: MEDICARE

## 2023-05-04 VITALS
BODY MASS INDEX: 28.27 KG/M2 | DIASTOLIC BLOOD PRESSURE: 70 MMHG | HEART RATE: 68 BPM | WEIGHT: 197 LBS | SYSTOLIC BLOOD PRESSURE: 122 MMHG

## 2023-05-04 DIAGNOSIS — R06.02 SOB (SHORTNESS OF BREATH): Primary | ICD-10-CM

## 2023-05-04 DIAGNOSIS — I34.0 NONRHEUMATIC MITRAL VALVE REGURGITATION: ICD-10-CM

## 2023-05-04 DIAGNOSIS — I35.1 NONRHEUMATIC AORTIC VALVE INSUFFICIENCY: ICD-10-CM

## 2023-05-04 DIAGNOSIS — I10 ESSENTIAL HYPERTENSION: ICD-10-CM

## 2023-05-04 PROCEDURE — 99214 OFFICE O/P EST MOD 30 MIN: CPT | Performed by: INTERNAL MEDICINE

## 2023-05-04 PROCEDURE — 1123F ACP DISCUSS/DSCN MKR DOCD: CPT | Performed by: INTERNAL MEDICINE

## 2023-08-03 ENCOUNTER — OFFICE VISIT (OUTPATIENT)
Dept: CARDIOLOGY CLINIC | Age: 88
End: 2023-08-03

## 2023-08-03 VITALS
SYSTOLIC BLOOD PRESSURE: 108 MMHG | DIASTOLIC BLOOD PRESSURE: 74 MMHG | HEART RATE: 68 BPM | BODY MASS INDEX: 29.99 KG/M2 | WEIGHT: 209 LBS

## 2023-08-03 DIAGNOSIS — I34.0 NONRHEUMATIC MITRAL VALVE REGURGITATION: ICD-10-CM

## 2023-08-03 DIAGNOSIS — I10 ESSENTIAL HYPERTENSION: ICD-10-CM

## 2023-08-03 DIAGNOSIS — I35.1 NONRHEUMATIC AORTIC VALVE INSUFFICIENCY: ICD-10-CM

## 2023-08-03 DIAGNOSIS — R06.02 SOB (SHORTNESS OF BREATH): Primary | ICD-10-CM

## 2023-08-03 NOTE — PROGRESS NOTES
BY MOUTH EVERY DAY 90 tablet 3    calcium carbonate (OSCAL) 500 MG TABS tablet Take by mouth      atorvastatin (LIPITOR) 40 MG tablet Take 1 tablet by mouth daily      hydrochlorothiazide (HYDRODIURIL) 25 MG tablet Take 1 tablet by mouth daily. 30 tablet 6    potassium chloride (KLOR-CON) 20 MEQ packet Take 20 mEq by mouth Twice a Week      aspirin 81 MG EC tablet Take 1 tablet by mouth daily       No current facility-administered medications for this visit. Vitals:    08/03/23 1017   BP: 108/74   Pulse: 68         wt 209 ( up 12 lbs.)        Review of Systems   Constitutional: Negative for activity change and fatigue. Respiratory: Negative for apnea, cough, choking, chest tightness and shortness of breath. Cardiovascular: Negative for chest pain, palpitations and leg swelling. No PND or orthopnea. No tachycardia. Gastrointestinal: Negative for abdominal distention. Musculoskeletal: Negative for myalgias. Neurological: Negative for dizziness, syncope and light-headedness. Psychiatric/Behavioral: Negative for behavioral problems and confusion. All other systems reviewed negative as done. Objective:   Physical Exam   Constitutional: He is oriented to person, place, and time. He appears well-developed and well-nourished. No distress. HENT:   Head: Normocephalic and atraumatic. Eyes: Conjunctivae and EOM are normal. Right eye exhibits no discharge. Left eye exhibits no discharge. Neck: Normal range of motion. Neck supple. No JVD present. Cardiovascular: Normal rate, regular rhythm, S1 normal and S2 normal.  Exam reveals no gallop. Murmur heard. Systolic murmur is present with a grade of 1/6   Pulses:       Radial pulses are 2+ on the right side, and 2+ on the left side. Pulmonary/Chest: Effort normal and breath sounds normal. No respiratory distress. He has no wheezes. He has no rales. Abdominal: Soft. Bowel sounds are normal. No tenderness.    Musculoskeletal:

## 2023-08-10 RX ORDER — ATENOLOL 25 MG/1
TABLET ORAL
Qty: 90 TABLET | Refills: 3 | Status: SHIPPED | OUTPATIENT
Start: 2023-08-10

## 2023-09-05 ENCOUNTER — HOSPITAL ENCOUNTER (EMERGENCY)
Age: 88
Discharge: HOME | End: 2023-09-05
Attending: EMERGENCY MEDICINE
Payer: MEDICARE

## 2023-09-05 ENCOUNTER — APPOINTMENT (OUTPATIENT)
Dept: CT IMAGING | Age: 88
End: 2023-09-05
Payer: MEDICARE

## 2023-09-05 ENCOUNTER — APPOINTMENT (OUTPATIENT)
Dept: GENERAL RADIOLOGY | Age: 88
End: 2023-09-05
Payer: MEDICARE

## 2023-09-05 VITALS
HEIGHT: 70 IN | RESPIRATION RATE: 18 BRPM | HEART RATE: 55 BPM | OXYGEN SATURATION: 95 % | SYSTOLIC BLOOD PRESSURE: 110 MMHG | TEMPERATURE: 97.1 F | DIASTOLIC BLOOD PRESSURE: 65 MMHG | WEIGHT: 208.11 LBS | BODY MASS INDEX: 29.79 KG/M2

## 2023-09-05 DIAGNOSIS — E04.1 THYROID NODULE: ICD-10-CM

## 2023-09-05 DIAGNOSIS — D69.6 THROMBOCYTOPENIA (HCC): ICD-10-CM

## 2023-09-05 DIAGNOSIS — G45.9 TIA (TRANSIENT ISCHEMIC ATTACK): Primary | ICD-10-CM

## 2023-09-05 DIAGNOSIS — D32.9 MENINGIOMA (HCC): ICD-10-CM

## 2023-09-05 LAB
ALBUMIN SERPL-MCNC: 4.1 G/DL (ref 3.4–5)
ALBUMIN/GLOB SERPL: 1.7 {RATIO} (ref 1.1–2.2)
ALP SERPL-CCNC: 81 U/L (ref 40–129)
ALT SERPL-CCNC: 12 U/L (ref 10–40)
ANION GAP SERPL CALCULATED.3IONS-SCNC: 9 MMOL/L (ref 3–16)
ANISOCYTOSIS BLD QL SMEAR: ABNORMAL
AST SERPL-CCNC: 18 U/L (ref 15–37)
BASOPHILS # BLD: 0.1 K/UL (ref 0–0.2)
BASOPHILS NFR BLD: 1 %
BILIRUB SERPL-MCNC: 0.7 MG/DL (ref 0–1)
BUN SERPL-MCNC: 30 MG/DL (ref 7–20)
CALCIUM SERPL-MCNC: 10.1 MG/DL (ref 8.3–10.6)
CHLORIDE SERPL-SCNC: 103 MMOL/L (ref 99–110)
CO2 SERPL-SCNC: 28 MMOL/L (ref 21–32)
CREAT SERPL-MCNC: 0.9 MG/DL (ref 0.8–1.3)
DEPRECATED RDW RBC AUTO: 20 % (ref 12.4–15.4)
EKG ATRIAL RATE: 52 BPM
EKG DIAGNOSIS: NORMAL
EKG P AXIS: 19 DEGREES
EKG P-R INTERVAL: 182 MS
EKG Q-T INTERVAL: 458 MS
EKG QRS DURATION: 112 MS
EKG QTC CALCULATION (BAZETT): 425 MS
EKG R AXIS: -27 DEGREES
EKG T AXIS: 42 DEGREES
EKG VENTRICULAR RATE: 52 BPM
EOSINOPHIL # BLD: 0.3 K/UL (ref 0–0.6)
EOSINOPHIL NFR BLD: 3.4 %
GFR SERPLBLD CREATININE-BSD FMLA CKD-EPI: >60 ML/MIN/{1.73_M2}
GLUCOSE SERPL-MCNC: 91 MG/DL (ref 70–99)
HCT VFR BLD AUTO: 47.5 % (ref 40.5–52.5)
HGB BLD-MCNC: 15 G/DL (ref 13.5–17.5)
INR PPP: 0.99 (ref 0.84–1.16)
LYMPHOCYTES # BLD: 1.3 K/UL (ref 1–5.1)
LYMPHOCYTES NFR BLD: 15.1 %
MCH RBC QN AUTO: 28.2 PG (ref 26–34)
MCHC RBC AUTO-ENTMCNC: 31.6 G/DL (ref 31–36)
MCV RBC AUTO: 89 FL (ref 80–100)
MONOCYTES # BLD: 0.9 K/UL (ref 0–1.3)
MONOCYTES NFR BLD: 10.8 %
NEUTROPHILS # BLD: 5.8 K/UL (ref 1.7–7.7)
NEUTROPHILS NFR BLD: 69.7 %
PLATELET # BLD AUTO: 55 K/UL (ref 135–450)
PLATELET BLD QL SMEAR: ABNORMAL
PMV BLD AUTO: 8.5 FL (ref 5–10.5)
POTASSIUM SERPL-SCNC: 3.8 MMOL/L (ref 3.5–5.1)
PROT SERPL-MCNC: 6.5 G/DL (ref 6.4–8.2)
PROTHROMBIN TIME: 13.1 SEC (ref 11.5–14.8)
RBC # BLD AUTO: 5.34 M/UL (ref 4.2–5.9)
SLIDE REVIEW: ABNORMAL
SODIUM SERPL-SCNC: 140 MMOL/L (ref 136–145)
TROPONIN, HIGH SENSITIVITY: 18 NG/L (ref 0–22)
WBC # BLD AUTO: 8.4 K/UL (ref 4–11)

## 2023-09-05 PROCEDURE — 71045 X-RAY EXAM CHEST 1 VIEW: CPT

## 2023-09-05 PROCEDURE — 99285 EMERGENCY DEPT VISIT HI MDM: CPT

## 2023-09-05 PROCEDURE — 93005 ELECTROCARDIOGRAM TRACING: CPT | Performed by: PHYSICIAN ASSISTANT

## 2023-09-05 PROCEDURE — 6360000004 HC RX CONTRAST MEDICATION: Performed by: PHYSICIAN ASSISTANT

## 2023-09-05 PROCEDURE — 93010 ELECTROCARDIOGRAM REPORT: CPT | Performed by: INTERNAL MEDICINE

## 2023-09-05 PROCEDURE — 70498 CT ANGIOGRAPHY NECK: CPT

## 2023-09-05 PROCEDURE — 80053 COMPREHEN METABOLIC PANEL: CPT

## 2023-09-05 PROCEDURE — 70450 CT HEAD/BRAIN W/O DYE: CPT

## 2023-09-05 PROCEDURE — 84484 ASSAY OF TROPONIN QUANT: CPT

## 2023-09-05 PROCEDURE — 85025 COMPLETE CBC W/AUTO DIFF WBC: CPT

## 2023-09-05 PROCEDURE — 85610 PROTHROMBIN TIME: CPT

## 2023-09-05 RX ADMIN — IOPAMIDOL 75 ML: 755 INJECTION, SOLUTION INTRAVENOUS at 15:58

## 2023-09-05 ASSESSMENT — PAIN - FUNCTIONAL ASSESSMENT
PAIN_FUNCTIONAL_ASSESSMENT: NONE - DENIES PAIN
PAIN_FUNCTIONAL_ASSESSMENT: 0-10

## 2023-09-05 ASSESSMENT — VISUAL ACUITY
OS: 20 25
OU: 20 20
OD: 20 20

## 2023-09-05 ASSESSMENT — LIFESTYLE VARIABLES
HOW OFTEN DO YOU HAVE A DRINK CONTAINING ALCOHOL: 2-4 TIMES A MONTH
HOW MANY STANDARD DRINKS CONTAINING ALCOHOL DO YOU HAVE ON A TYPICAL DAY: 1 OR 2

## 2023-09-05 ASSESSMENT — PAIN SCALES - GENERAL: PAINLEVEL_OUTOF10: 0

## 2023-09-05 NOTE — DISCHARGE INSTRUCTIONS
Follow-up with [address] [facility] in 1 to 2 days. Call today for an appointment to be seen as soon as possible for further evaluation of your right eye visual loss considered to be TIA. Follow-up with your optometrist next week as scheduled. Follow-up with your primary care physician in 1 to 2 days for reexamination and to schedule outpatient 2D echocardiogram and MRI brain with and without contrast for further evaluation of TIA as well as meningioma measuring 2.5 cm on the right tentorial leaflet. This should be coordinated by your PCP. To be done as an outpatient as soon as possible. I did place referral to Dr. Katelynn Fermin neurology. Take aspirin 162 mg daily until seen by your primary care physician or after phone conversation with hematologist Dr. Araceli Cuevas. .  Your platelets were low at 55. Recommend contact your hematologist to approve continued use of aspirin. Ideally 324 mg recommended. If condition worsens or new symptoms develop, return immediately to the emergency department. A thyroid nodule incidentally noted on the scan. You may need thyroid ultrasound under direction of your PCP.

## 2023-09-05 NOTE — ED PROVIDER NOTES
[address] [facility] [address] [address]        Pt Name: Cinthia Peoples  MRN: [id number]  [address] [facility] [place] 10/22/1925  Date of evaluation: 9/5/2023  Provider: Aj Molina PA-C  PCP: Emi Queen MD  Note Started: 1:34 PM EDT 9/5/23       I have seen and evaluated this patient with my supervising physician Africa Rutledge, [facility]       Chief Complaint   Patient presents with    Loss of Vision     Pt reports loss of vision with an opaque blue color in rt eye for about 10 seconds last Wednesday and again on Sunday. Reports no unilateral weakness. Had some left shoulder weakness/sore feeling lasting about 10 seconds yesterday. HISTORY OF PRESENT ILLNESS: 1 or more Elements     History From: Patient    Cinthia Peoples is a 80 y.o. male who presents to the emergency department accompanied by his daughter. Patient family/other daughter apparently contacted optometry with complaint and referred to PCP with complaint and PCP referred to ED for evaluation. It is reported to me by daughter and not recalled by patient that on Wednesday he had a less than 5-second momentary loss or change in vision right eye. Describes a blue cast to the visual field. Does not recall it being black or complete loss of vision. He states same occurred 48 hours ago or on Sunday for less than 5 seconds. He does not recall prior to this. Is not occurred since then. He does take aspirin 81 mg each evening. The patient without history of CVA. The patient with history of HCM, hearing deficit with hearing aids, HLD, aortic valve disorders, mitral valve disorder, or impairment, chronic constipation polycythemia, thrombocytopenia    The patient with history of shortness of breath complaint and was seen by cardiology on 8/3/2023. At that time no complaint of PND or orthopnea. No tachycardia.   Heart murmur noted on exam.  Lungs are otherwise noted to be cleared by

## 2023-09-05 NOTE — ED PROVIDER NOTES
[address] [facility] [address] [address]        Attending Note:    The patient was seen and examined by the physician assistant. I also completed a face-to-face examination. Pt Name: Irene Cavazos  MRN: [id number]  [address] [facility] 10/22/1925  Date of evaluation: 9/5/2023  Provider: Dirk Ferro       Chief Complaint   Patient presents with    Loss of Vision     Pt reports loss of vision with an opaque blue color in rt eye for about 10 seconds last Wednesday and again on Sunday. Reports no unilateral weakness. Had some left shoulder weakness/sore feeling lasting about 10 seconds yesterday. HISTORY OF PRESENT ILLNESS   (Location/Symptom, Timing/Onset, Context/Setting, Quality, Duration, Modifying Factors, Severity)  Note limiting factors. Irene Cavazos is a 80 y.o. male who presents to the emergency department with complaint of vision loss in the right eye that occurred on Wednesday of last week, 6 days ago. He reports that he was sitting in a chair and suddenly his vision in the right eye turned bluish-gray for approximately 5 to 10 seconds and then resolved. He states that it was only in the right eye. There was no associated pain or discomfort. No headache, neck pain, chest pain or shortness of breath. He denies any associated dizziness, lightheadedness, vertigo, hearing loss, tinnitus, facial droop, difficulty speaking, focal weakness or numbness in the extremities. He denies any nausea vomiting diarrhea fever or chills. No recent illness. He reports that he had a second episode on Sunday, 2 days ago that was identical lasting for 5 to 10 seconds. Currently he reports that his vision is normal.  He wears glasses. He denies any visual disturbance or visual loss. No other associated symptoms. He states that he feels fine.     Medical history is significant for some dementia with some mild short-term memory loss,

## 2023-09-05 NOTE — ED NOTES
D/C: Order noted for d/c. Pt confirmed d/c paperwork has correct name. Discharge and education instructions reviewed with patient. Teach-back successful. Pt verbalized understanding and signed d/c papers. Pt denied questions at this time. No acute distress noted. Patient instructed to follow-up as noted - return to emergency department if symptoms worsen. Patient verbalized understanding. Discharged per EDMD with discharge instructions. Pt discharged to private vehicle with family. Patient stable upon departure. Thanked patient for choosing [facility] for care. Provider aware of patient pain at time of discharge.        Valorie Wallace, RN  09/05/23 [id number]

## 2023-10-26 ENCOUNTER — OFFICE VISIT (OUTPATIENT)
Dept: CARDIOLOGY CLINIC | Age: 88
End: 2023-10-26
Payer: MEDICARE

## 2023-10-26 VITALS
BODY MASS INDEX: 30.42 KG/M2 | DIASTOLIC BLOOD PRESSURE: 60 MMHG | SYSTOLIC BLOOD PRESSURE: 126 MMHG | HEART RATE: 70 BPM | WEIGHT: 212 LBS

## 2023-10-26 DIAGNOSIS — I10 ESSENTIAL HYPERTENSION: ICD-10-CM

## 2023-10-26 DIAGNOSIS — R06.02 SOB (SHORTNESS OF BREATH): Primary | ICD-10-CM

## 2023-10-26 DIAGNOSIS — I35.1 NONRHEUMATIC AORTIC VALVE INSUFFICIENCY: ICD-10-CM

## 2023-10-26 DIAGNOSIS — I34.0 NONRHEUMATIC MITRAL VALVE REGURGITATION: ICD-10-CM

## 2023-10-26 PROCEDURE — 1123F ACP DISCUSS/DSCN MKR DOCD: CPT | Performed by: INTERNAL MEDICINE

## 2023-10-26 PROCEDURE — 99214 OFFICE O/P EST MOD 30 MIN: CPT | Performed by: INTERNAL MEDICINE

## 2023-10-26 NOTE — PROGRESS NOTES
Subjective:      Patient ID: Ines Mohamud is a 80 y.o. male. HPI:  Isabelle Corley is being seen for follow up for  HTN/AI/MR. Still sob. When getting ready. Not real active due to knees. Wt up. In house. No edema. No pnd or orthopnea. No palp/tachy/syncope. Wt down. bp good. 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)     Polycythemia      Past Surgical History:   Procedure Laterality Date    FRACTURE SURGERY      left ankle fracture    MASTOID SURGERY      Childhood    TOTAL KNEE ARTHROPLASTY Left     Partial       No Known Allergies     Social History     Socioeconomic History    Marital status:      Spouse name: Not on file    Number of children: 5    Years of education: Not on file    Highest education level: Not on file   Occupational History    Not on file   Tobacco Use    Smoking status: Never    Smokeless tobacco: Never   Substance and Sexual Activity    Alcohol use: Not Currently     Comment: Rarely    Drug use: No    Sexual activity: Not on file   Other Topics Concern    Not on file   Social History Narrative    ** Merged History Encounter **          Social Determinants of Health     Financial Resource Strain: Low Risk  (9/29/2023)    Overall Financial Resource Strain (CARDIA)     Difficulty of Paying Living Expenses: Not hard at all   Food Insecurity: No Food Insecurity (9/29/2023)    Hunger Vital Sign     Worried About [place] in the Last Year: Never true     [address] in the Last Year: Never true   Transportation Needs: Unknown (9/29/2023)    PRAPARE - Transportation     Lack of Transportation (Medical): Not on file     Lack of Transportation (Non-Medical):  No   Physical Activity: Not on file   Stress: Not on file   Social Connections: Not on file   Intimate Partner

## 2024-01-05 DIAGNOSIS — E03.8 SUBCLINICAL HYPOTHYROIDISM: ICD-10-CM

## 2024-01-05 LAB — T4 FREE SERPL-MCNC: 1.1 NG/DL (ref 0.9–1.8)

## 2024-01-11 ENCOUNTER — OFFICE VISIT (OUTPATIENT)
Dept: CARDIOLOGY CLINIC | Age: 89
End: 2024-01-11
Payer: MEDICARE

## 2024-01-11 VITALS
SYSTOLIC BLOOD PRESSURE: 132 MMHG | WEIGHT: 213 LBS | BODY MASS INDEX: 30.56 KG/M2 | DIASTOLIC BLOOD PRESSURE: 70 MMHG | HEART RATE: 60 BPM

## 2024-01-11 DIAGNOSIS — I10 ESSENTIAL HYPERTENSION: ICD-10-CM

## 2024-01-11 DIAGNOSIS — I34.0 NONRHEUMATIC MITRAL VALVE REGURGITATION: ICD-10-CM

## 2024-01-11 DIAGNOSIS — I35.1 NONRHEUMATIC AORTIC VALVE INSUFFICIENCY: ICD-10-CM

## 2024-01-11 DIAGNOSIS — R06.02 SOB (SHORTNESS OF BREATH): Primary | ICD-10-CM

## 2024-01-11 PROCEDURE — 99214 OFFICE O/P EST MOD 30 MIN: CPT | Performed by: INTERNAL MEDICINE

## 2024-01-11 PROCEDURE — 1123F ACP DISCUSS/DSCN MKR DOCD: CPT | Performed by: INTERNAL MEDICINE

## 2024-01-11 NOTE — PROGRESS NOTES
Subjective:      Patient ID: Milton Victor is a 89 y.o. male.    HPI:  Freddie is being seen for follow up for  HTN/AI/MR.   Still some sob.  When getting ready. Not real active due to knees.  Wt up. In house.   No edema.  No pnd or orthopnea.  No palp/tachy/syncope.   Wt down.   bp good.           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)     Polycythemia      Past Surgical History:   Procedure Laterality Date    FRACTURE SURGERY      left ankle fracture    MASTOID SURGERY      Childhood    TOTAL KNEE ARTHROPLASTY Left     Partial       No Known Allergies     Social History     Socioeconomic History    Marital status:      Spouse name: Not on file    Number of children: 5    Years of education: Not on file    Highest education level: Not on file   Occupational History    Not on file   Tobacco Use    Smoking status: Never    Smokeless tobacco: Never   Substance and Sexual Activity    Alcohol use: Not Currently     Comment: Rarely    Drug use: No    Sexual activity: Not on file   Other Topics Concern    Not on file   Social History Narrative    ** Merged History Encounter **          Social Determinants of Health     Financial Resource Strain: Low Risk  (9/29/2023)    Overall Financial Resource Strain (CARDIA)     Difficulty of Paying Living Expenses: Not hard at all   Food Insecurity: Not on file (9/29/2023)   Transportation Needs: Unknown (9/29/2023)    PRAPARE - Transportation     Lack of Transportation (Medical): Not on file     Lack of Transportation (Non-Medical): No   Physical Activity: Not on file   Stress: Not on file   Social Connections: Not on file   Intimate Partner Violence: Not on file   Housing Stability: Unknown (9/29/2023)    Housing Stability Vital Sign     Unable to Pay for Housing in the Last

## 2024-03-12 ENCOUNTER — OFFICE VISIT (OUTPATIENT)
Dept: ENT CLINIC | Age: 89
End: 2024-03-12
Payer: MEDICARE

## 2024-03-12 VITALS
SYSTOLIC BLOOD PRESSURE: 116 MMHG | BODY MASS INDEX: 30.21 KG/M2 | HEART RATE: 64 BPM | DIASTOLIC BLOOD PRESSURE: 78 MMHG | WEIGHT: 211 LBS | RESPIRATION RATE: 16 BRPM | OXYGEN SATURATION: 91 % | HEIGHT: 70 IN

## 2024-03-12 DIAGNOSIS — H91.93 BILATERAL HEARING LOSS, UNSPECIFIED HEARING LOSS TYPE: ICD-10-CM

## 2024-03-12 DIAGNOSIS — H65.23 BILATERAL CHRONIC SEROUS OTITIS MEDIA: ICD-10-CM

## 2024-03-12 DIAGNOSIS — J31.0 CHRONIC RHINITIS: ICD-10-CM

## 2024-03-12 DIAGNOSIS — H72.93 TYMPANIC MEMBRANE PERFORATION, BILATERAL: Primary | ICD-10-CM

## 2024-03-12 PROCEDURE — 1123F ACP DISCUSS/DSCN MKR DOCD: CPT | Performed by: STUDENT IN AN ORGANIZED HEALTH CARE EDUCATION/TRAINING PROGRAM

## 2024-03-12 PROCEDURE — 99204 OFFICE O/P NEW MOD 45 MIN: CPT | Performed by: STUDENT IN AN ORGANIZED HEALTH CARE EDUCATION/TRAINING PROGRAM

## 2024-03-12 RX ORDER — FLUTICASONE PROPIONATE 50 MCG
1 SPRAY, SUSPENSION (ML) NASAL 2 TIMES DAILY
Qty: 16 G | Refills: 1 | Status: SHIPPED | OUTPATIENT
Start: 2024-03-12

## 2024-03-12 RX ORDER — CIPROFLOXACIN AND DEXAMETHASONE 3; 1 MG/ML; MG/ML
4 SUSPENSION/ DROPS AURICULAR (OTIC) 2 TIMES DAILY
Qty: 1 EACH | Refills: 0 | Status: SHIPPED | OUTPATIENT
Start: 2024-03-12 | End: 2024-03-26

## 2024-03-12 NOTE — PROGRESS NOTES
recognition software.  Occasionally, words are mistranscribed and despite editing, the text may contain inaccuracies due to incorrect word recognition.  If further clarification is needed please contact the office at [phone].    An electronic signature was used to authenticate this note.    --Renan Maier MD    [facility]: I recommended use a gentle skin cleanser when washing the skin. I also recommended application of a moisturizer twice daily. Products with fragrances, preservatives and dyes should be avoided. Detail Level: Detailed

## 2024-04-03 ENCOUNTER — TELEPHONE (OUTPATIENT)
Dept: ENT CLINIC | Age: 89
End: 2024-04-03

## 2024-04-03 RX ORDER — FLUTICASONE PROPIONATE 50 MCG
1 SPRAY, SUSPENSION (ML) NASAL 2 TIMES DAILY
Qty: 16 G | Refills: 1 | Status: SHIPPED | OUTPATIENT
Start: 2024-04-03

## 2024-05-02 ENCOUNTER — OFFICE VISIT (OUTPATIENT)
Dept: CARDIOLOGY CLINIC | Age: 89
End: 2024-05-02
Payer: MEDICARE

## 2024-05-02 VITALS
WEIGHT: 213 LBS | BODY MASS INDEX: 30.56 KG/M2 | SYSTOLIC BLOOD PRESSURE: 114 MMHG | HEART RATE: 60 BPM | DIASTOLIC BLOOD PRESSURE: 70 MMHG

## 2024-05-02 DIAGNOSIS — R06.02 SOB (SHORTNESS OF BREATH): Primary | ICD-10-CM

## 2024-05-02 DIAGNOSIS — I35.1 NONRHEUMATIC AORTIC VALVE INSUFFICIENCY: ICD-10-CM

## 2024-05-02 DIAGNOSIS — I10 ESSENTIAL HYPERTENSION: ICD-10-CM

## 2024-05-02 DIAGNOSIS — I34.0 NONRHEUMATIC MITRAL VALVE REGURGITATION: ICD-10-CM

## 2024-05-02 PROCEDURE — 1123F ACP DISCUSS/DSCN MKR DOCD: CPT | Performed by: INTERNAL MEDICINE

## 2024-05-02 PROCEDURE — 99214 OFFICE O/P EST MOD 30 MIN: CPT | Performed by: INTERNAL MEDICINE

## 2024-05-02 RX ORDER — HYDROXYUREA 500 MG/1
CAPSULE ORAL
COMMUNITY
Start: 2024-03-26

## 2024-05-02 RX ORDER — NYSTATIN 100000 U/G
CREAM TOPICAL
COMMUNITY

## 2024-05-02 RX ORDER — HYDROCHLOROTHIAZIDE 50 MG/1
50 TABLET ORAL EVERY MORNING
Qty: 90 TABLET | Refills: 3 | Status: SHIPPED | OUTPATIENT
Start: 2024-05-02

## 2024-05-02 NOTE — PROGRESS NOTES
Gastrointestinal: Negative for abdominal distention.   Musculoskeletal: Negative for myalgias.   Neurological: Negative for dizziness, syncope and light-headedness.   Psychiatric/Behavioral: Negative for behavioral problems and confusion.   All other systems reviewed negative as done.     Objective:   Physical Exam   Constitutional: He is oriented to person, place, and time. He appears well-developed and well-nourished. No distress.   HENT:   Head: Normocephalic and atraumatic.   Eyes: Conjunctivae and EOM are normal. Right eye exhibits no discharge. Left eye exhibits no discharge.   Neck: Normal range of motion. Neck supple. No JVD present.   Cardiovascular: Normal rate, regular rhythm, S1 normal and S2 normal.  Exam reveals no gallop.    Murmur heard.   Systolic murmur is present with a grade of 1/6   Pulses:       Radial pulses are 2+ on the right side, and 2+ on the left side.   Pulmonary/Chest: Effort normal and breath sounds normal. No respiratory distress. He has no wheezes. He has no rales.   Abdominal: Soft. Bowel sounds are normal. No tenderness.   Musculoskeletal: Normal range of motion. He exhibits+ edema and no tenderness.   Neurological: He is alert and oriented to person, place, and time.   Skin: Skin is warm and dry.   Psychiatric: He has a normal mood and affect. His behavior is normal. Thought content normal.       Assessment:      Diagnosis Orders   1. SOB (shortness of breath)        2. Essential hypertension        3. Nonrheumatic aortic valve insufficiency        4. Nonrheumatic mitral valve regurgitation                Plan:   Assessment & Plan   Still sob. Not real active due to knee/arthritis but more active.  Sx may be related to wt and deconditions. More edema. Not real active. Wt stable.   BP good. No chest pain.   Will continue atenolol/HCTZ/ASA/Lipitor.  Increase HCTZ  50 mg qd. EPl / BNP in l wk.  No changes.  Continue to monitor. Reviewed previous records and testing including echo

## 2024-05-20 ENCOUNTER — OFFICE VISIT (OUTPATIENT)
Dept: CARDIOLOGY CLINIC | Age: 89
End: 2024-05-20
Payer: MEDICARE

## 2024-05-20 VITALS
DIASTOLIC BLOOD PRESSURE: 80 MMHG | WEIGHT: 212 LBS | HEART RATE: 60 BPM | BODY MASS INDEX: 30.42 KG/M2 | SYSTOLIC BLOOD PRESSURE: 130 MMHG

## 2024-05-20 DIAGNOSIS — I10 ESSENTIAL HYPERTENSION: ICD-10-CM

## 2024-05-20 DIAGNOSIS — I34.0 NONRHEUMATIC MITRAL VALVE REGURGITATION: ICD-10-CM

## 2024-05-20 DIAGNOSIS — R06.02 SOB (SHORTNESS OF BREATH): Primary | ICD-10-CM

## 2024-05-20 DIAGNOSIS — I35.1 NONRHEUMATIC AORTIC VALVE INSUFFICIENCY: ICD-10-CM

## 2024-05-20 PROCEDURE — 1123F ACP DISCUSS/DSCN MKR DOCD: CPT | Performed by: INTERNAL MEDICINE

## 2024-05-20 PROCEDURE — 99214 OFFICE O/P EST MOD 30 MIN: CPT | Performed by: INTERNAL MEDICINE

## 2024-05-20 NOTE — PROGRESS NOTES
Not on file   Intimate Partner Violence: Not on file   Housing Stability: Unknown (9/29/2023)    Housing Stability Vital Sign     Unable to Pay for Housing in the Last Year: Not on file     Number of Places Lived in the Last Year: Not on file     Unstable Housing in the Last Year: No        Patient has a family history includes Cancer in his father and mother.    Patient  has a past medical history of Amaurosis fugax of right eye, Aortic valve disorders, Essential hypertension, Hearing loss, Hyperlipidemia, Hypertension, Kidney stone, Mitral valve disorders(424.0), and Polycythemia.     Current Outpatient Medications   Medication Sig Dispense Refill    levothyroxine (SYNTHROID) 25 MCG tablet Take 1 tablet by mouth daily 30 tablet 1    hydroxyurea (HYDREA) 500 MG chemo capsule TAKE 1 TABLET DAILY ON MON/WED/FRIDAY/SAT/SUN.      nystatin (MYCOSTATIN) 147688 UNIT/GM cream APPLY TO AFFECTED AREA OF PENIS RASH TWICE DAILY X 4 WEEKS -RESUME WITH FLARES      hydroCHLOROthiazide (HYDRODIURIL) 50 MG tablet Take 1 tablet by mouth every morning 90 tablet 3    Lift Chair MISC by Does not apply route 1 each 0    fluticasone (FLONASE) 50 MCG/ACT nasal spray 1 spray by Nasal route 2 times daily 16 g 1    atorvastatin (LIPITOR) 40 MG tablet Take 1 tablet by mouth daily 30 tablet 5    lactulose (CHRONULAC) 10 GM/15ML solution Take 15-30 mLs by mouth nightly as needed (Constipation) 1 each 5    memantine (NAMENDA) 5 MG tablet Take 1 tablet by mouth 2 times daily 60 tablet 0    fluorouracil (EFUDEX) 5 % cream PLEASE SEE ATTACHED FOR DETAILED DIRECTIONS      mupirocin (BACTROBAN) 2 % ointment Apply 0.5 inches topically 3 times daily      atenolol (TENORMIN) 25 MG tablet TAKE 1 TABLET BY MOUTH EVERY DAY 90 tablet 3    calcium carbonate (OSCAL) 500 MG TABS tablet Take by mouth      potassium chloride (KLOR-CON) 20 MEQ packet Take 20 mEq by mouth Twice a Week      aspirin 81 MG EC tablet Take 1 tablet by mouth daily       No current

## 2024-07-22 RX ORDER — FLUTICASONE PROPIONATE 50 MCG
SPRAY, SUSPENSION (ML) NASAL
Qty: 1 EACH | Refills: 1 | Status: SHIPPED | OUTPATIENT
Start: 2024-07-22

## 2024-08-05 RX ORDER — ATENOLOL 25 MG/1
TABLET ORAL
Qty: 90 TABLET | Refills: 3 | Status: SHIPPED | OUTPATIENT
Start: 2024-08-05

## 2024-08-25 NOTE — PROGRESS NOTES
[place] [address]  [phone]    Primary Cardiologist: Dr Marshall    CC 3 month f/u, HTN, HLD     HPI:  89 y.o. patient of Dr Marshall here HTN and HLD . He continues to have his typical REGAN which resolves 30 seconds after resting. He denies cp, LH/dizziness, palpitations, syncope or falls. No weight gain, orthopnea, PND, abdominal bloating or early satiety. No n/v/d, fever or GI/ bleeding. Tolerating medications.     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)     Polycythemia      Past Surgical History:   Procedure Laterality Date    FRACTURE SURGERY      left ankle fracture    MASTOID SURGERY      Childhood    TOTAL KNEE ARTHROPLASTY Left     Partial     Family History   Problem Relation Age of Onset    Cancer Mother     Cancer Father      Social History     Tobacco Use    Smoking status: Never    Smokeless tobacco: Never   Substance Use Topics    Alcohol use: Not Currently     Comment: Rarely    Drug use: No     Allergies:Namenda [memantine hcl]    Review of Systems  All 12 point review of symptoms completed. Pertinent positives identified in the HPI, all other review of symptoms negative.    Physical Exam:  /66   Pulse 55   Wt 93.4 kg (205 lb 12.8 oz)   BMI 29.53 kg/m²    General (appearance):  No acute distress  Eyes: anicteric   Neck: soft, No JVD  Ears/Nose/Mouth/Thorat: No cyanosis  CV: RRR   Respiratory:  clear  GI: soft, non-tender, non-distended  Skin: Warm, dry. No rashes  Neuro/Psych: Alert and oriented x 3. Appropriate behavior  Ext:  No c/c. No edema      Weight  Wt Readings from Last 3 Encounters:   08/21/24 96.2 kg (212 lb)   05/20/24 96.2 kg (212 lb)   05/07/24 96.6 kg (213 lb)        CBC:   Lab Results   Component Value Date    WBC 11.3 (H) 06/13/2024

## 2024-08-26 ENCOUNTER — OFFICE VISIT (OUTPATIENT)
Dept: CARDIOLOGY CLINIC | Age: 89
End: 2024-08-26
Payer: MEDICARE

## 2024-08-26 VITALS
BODY MASS INDEX: 29.53 KG/M2 | SYSTOLIC BLOOD PRESSURE: 108 MMHG | DIASTOLIC BLOOD PRESSURE: 66 MMHG | WEIGHT: 205.8 LBS | HEART RATE: 55 BPM

## 2024-08-26 DIAGNOSIS — I10 ESSENTIAL HYPERTENSION: Primary | ICD-10-CM

## 2024-08-26 DIAGNOSIS — I35.9 AORTIC VALVE DISORDER: ICD-10-CM

## 2024-08-26 DIAGNOSIS — I05.9 MITRAL VALVE DISORDER: ICD-10-CM

## 2024-08-26 PROCEDURE — 1123F ACP DISCUSS/DSCN MKR DOCD: CPT | Performed by: NURSE PRACTITIONER

## 2024-08-26 PROCEDURE — 93000 ELECTROCARDIOGRAM COMPLETE: CPT | Performed by: NURSE PRACTITIONER

## 2024-08-26 PROCEDURE — 99215 OFFICE O/P EST HI 40 MIN: CPT | Performed by: NURSE PRACTITIONER

## 2024-09-19 RX ORDER — FLUTICASONE PROPIONATE 50 MCG
SPRAY, SUSPENSION (ML) NASAL
Qty: 1 EACH | Refills: 1 | Status: SHIPPED | OUTPATIENT
Start: 2024-09-19

## 2024-11-18 RX ORDER — FLUTICASONE PROPIONATE 50 MCG
SPRAY, SUSPENSION (ML) NASAL
Qty: 1 EACH | Refills: 5 | Status: SHIPPED | OUTPATIENT
Start: 2024-11-18

## 2024-11-18 NOTE — TELEPHONE ENCOUNTER
Refill Request:     Last Office Visit:  3/12/2024     Next Scheduled Visit : Visit date not found     Medication Requested:   Requested Prescriptions     Pending Prescriptions Disp Refills    fluticasone (FLONASE) 50 MCG/ACT nasal spray [Pharmacy Med Name: FLUTICASONE PROP 50 MCG SPRAY]  1     Sig: USE 1 SPRAY INTO EACH NOSTRIL TWICE A DAY        Pharmacy:    [facility]/pharmacy #[id number] - [place], [place] - [address] - P [phone] - F [phone]  [facility] [address]  [facility] [address]  Phone: [phone] Fax: [phone]    [facility]/pharmacy #[id number] - [place], [place] - [address] [facility] -  [phone] - F [phone]  [address] [facility] [address]  Phone: [phone] Fax: [phone]

## 2025-01-03 NOTE — PROGRESS NOTES
displaced.      Heart sounds: S1 normal and S2 normal. Murmur heard.      No friction rub. No gallop.   Pulmonary:      Effort: Pulmonary effort is normal. No respiratory distress.      Breath sounds: Normal breath sounds. No stridor. No wheezing or rales.   Chest:      Chest wall: No tenderness.   Abdominal:      General: Bowel sounds are normal. There is no distension.      Palpations: Abdomen is soft.      Tenderness: There is no abdominal tenderness. There is no guarding or rebound.   Musculoskeletal:         General: No tenderness. Normal range of motion.      Cervical back: Normal range of motion.   Lymphadenopathy:      Cervical: No cervical adenopathy.   Skin:     General: Skin is warm and dry.      Findings: No erythema or rash.   Neurological:      Mental Status: He is alert and oriented to person, place, and time.      Coordination: Coordination normal.   Psychiatric:         Behavior: Behavior normal.         Thought Content: Thought content normal.         Judgment: Judgment normal.         Labs:       Lab Results   Component Value Date    WBC 13.2 (H) 12/03/2024    HGB 15.5 12/03/2024    HCT 50.3 (H) 12/03/2024    MCV 91.8 12/03/2024     12/03/2024     Lab Results   Component Value Date     12/03/2024    K 3.7 12/03/2024     12/03/2024    CO2 29 12/03/2024    BUN 25 12/03/2024    CREATININE 1.05 12/03/2024    GLUCOSE 117 (H) 12/03/2024    CALCIUM 9.8 12/03/2024    BILITOT 0.7 12/03/2024    ALKPHOS 78 12/03/2024    AST 15 12/03/2024    ALT 9 (L) 12/03/2024    LABGLOM 77 09/10/2024    GFRAA >60 05/27/2014    AGRATIO 2.0 09/10/2024    GLOB 2.6 05/27/2014         Lab Results   Component Value Date    CHOL 116 09/10/2024    CHOL 120 01/04/2024     Lab Results   Component Value Date    TRIG 99 09/10/2024    TRIG 92 01/04/2024     Lab Results   Component Value Date    HDL 36 (L) 09/10/2024    HDL 41 01/04/2024     No components found for: \"LDLCHOLESTEROL\", \"LDLCALC\"  Lab Results   Component

## 2025-01-13 ENCOUNTER — OFFICE VISIT (OUTPATIENT)
Dept: CARDIOLOGY CLINIC | Age: 89
End: 2025-01-13
Payer: MEDICARE

## 2025-01-13 VITALS
DIASTOLIC BLOOD PRESSURE: 76 MMHG | BODY MASS INDEX: 30.13 KG/M2 | HEART RATE: 71 BPM | OXYGEN SATURATION: 90 % | SYSTOLIC BLOOD PRESSURE: 130 MMHG | WEIGHT: 210 LBS

## 2025-01-13 DIAGNOSIS — I35.9 AORTIC VALVE DISORDER: Primary | ICD-10-CM

## 2025-01-13 DIAGNOSIS — I10 ESSENTIAL HYPERTENSION: ICD-10-CM

## 2025-01-13 DIAGNOSIS — I05.9 MITRAL VALVE DISORDER: ICD-10-CM

## 2025-01-13 DIAGNOSIS — E78.2 MIXED HYPERLIPIDEMIA: ICD-10-CM

## 2025-01-13 PROCEDURE — 1159F MED LIST DOCD IN RCRD: CPT | Performed by: INTERNAL MEDICINE

## 2025-01-13 PROCEDURE — 1123F ACP DISCUSS/DSCN MKR DOCD: CPT | Performed by: INTERNAL MEDICINE

## 2025-01-13 PROCEDURE — 99214 OFFICE O/P EST MOD 30 MIN: CPT | Performed by: INTERNAL MEDICINE

## 2025-01-13 ASSESSMENT — ENCOUNTER SYMPTOMS
COLOR CHANGE: 0
WHEEZING: 0
EYE DISCHARGE: 0
ABDOMINAL DISTENTION: 0
COUGH: 0
BLOOD IN STOOL: 0
SHORTNESS OF BREATH: 0
BACK PAIN: 0
ABDOMINAL PAIN: 0
CHEST TIGHTNESS: 0
VOMITING: 0
FACIAL SWELLING: 0

## 2025-01-14 ENCOUNTER — TELEPHONE (OUTPATIENT)
Dept: CARDIOLOGY CLINIC | Age: 89
End: 2025-01-14

## 2025-01-14 ENCOUNTER — APPOINTMENT (OUTPATIENT)
Dept: GENERAL RADIOLOGY | Age: 89
DRG: 065 | End: 2025-01-14
Payer: MEDICARE

## 2025-01-14 ENCOUNTER — APPOINTMENT (OUTPATIENT)
Dept: CT IMAGING | Age: 89
DRG: 065 | End: 2025-01-14
Payer: MEDICARE

## 2025-01-14 ENCOUNTER — HOSPITAL ENCOUNTER (INPATIENT)
Age: 89
LOS: 9 days | Discharge: HOSPICE | DRG: 065 | End: 2025-01-23
Attending: INTERNAL MEDICINE | Admitting: INTERNAL MEDICINE
Payer: MEDICARE
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
## 2025-01-14 DIAGNOSIS — R40.4 TRANSIENT ALTERATION OF AWARENESS: ICD-10-CM

## 2025-01-14 DIAGNOSIS — R09.02 HYPOXIA: ICD-10-CM

## 2025-01-14 DIAGNOSIS — Z51.5 HOSPICE CARE: ICD-10-CM

## 2025-01-14 DIAGNOSIS — Z71.89 GOALS OF CARE, COUNSELING/DISCUSSION: ICD-10-CM

## 2025-01-14 DIAGNOSIS — S09.90XA CLOSED HEAD INJURY, INITIAL ENCOUNTER: ICD-10-CM

## 2025-01-14 DIAGNOSIS — G45.9 TIA (TRANSIENT ISCHEMIC ATTACK): ICD-10-CM

## 2025-01-14 DIAGNOSIS — I63.9 ACUTE CVA (CEREBROVASCULAR ACCIDENT) (HCC): ICD-10-CM

## 2025-01-14 LAB
ALBUMIN SERPL-MCNC: 3.9 G/DL (ref 3.4–5)
ALBUMIN/GLOB SERPL: 1.5 {RATIO} (ref 1.1–2.2)
ALP SERPL-CCNC: 91 U/L (ref 40–129)
ALT SERPL-CCNC: <5 U/L (ref 10–40)
ANION GAP SERPL CALCULATED.3IONS-SCNC: 10 MMOL/L (ref 3–16)
AST SERPL-CCNC: 18 U/L (ref 15–37)
BASE EXCESS BLDV CALC-SCNC: 7.2 MMOL/L
BASOPHILS # BLD: 0.1 K/UL (ref 0–0.2)
BASOPHILS NFR BLD: 0.8 %
BILIRUB SERPL-MCNC: 0.6 MG/DL (ref 0–1)
BUN SERPL-MCNC: 23 MG/DL (ref 7–20)
CALCIUM SERPL-MCNC: 9.1 MG/DL (ref 8.3–10.6)
CHLORIDE SERPL-SCNC: 101 MMOL/L (ref 99–110)
CO2 BLDV-SCNC: 38 MMOL/L
CO2 SERPL-SCNC: 31 MMOL/L (ref 21–32)
COHGB MFR BLDV: 2 %
CREAT SERPL-MCNC: 1 MG/DL (ref 0.8–1.3)
DEPRECATED RDW RBC AUTO: 21.2 % (ref 12.4–15.4)
EOSINOPHIL # BLD: 0.3 K/UL (ref 0–0.6)
EOSINOPHIL NFR BLD: 2.1 %
FLUAV + FLUBV AG NOSE IA.RAPID: NOT DETECTED
FLUAV + FLUBV AG NOSE IA.RAPID: NOT DETECTED
GFR SERPLBLD CREATININE-BSD FMLA CKD-EPI: 68 ML/MIN/{1.73_M2}
GLUCOSE SERPL-MCNC: 119 MG/DL (ref 70–99)
HCO3 BLDV-SCNC: 36 MMOL/L (ref 23–29)
HCT VFR BLD AUTO: 49 % (ref 40.5–52.5)
HGB BLD-MCNC: 14.8 G/DL (ref 13.5–17.5)
LYMPHOCYTES # BLD: 1.1 K/UL (ref 1–5.1)
LYMPHOCYTES NFR BLD: 8 %
MCH RBC QN AUTO: 28.7 PG (ref 26–34)
MCHC RBC AUTO-ENTMCNC: 30.2 G/DL (ref 31–36)
MCV RBC AUTO: 95.2 FL (ref 80–100)
METHGB MFR BLDV: 0.6 %
MONOCYTES # BLD: 1.3 K/UL (ref 0–1.3)
MONOCYTES NFR BLD: 9.1 %
NEUTROPHILS # BLD: 11.2 K/UL (ref 1.7–7.7)
NEUTROPHILS NFR BLD: 80 %
NT-PROBNP SERPL-MCNC: 203 PG/ML (ref 0–449)
O2 THERAPY: ABNORMAL
PCO2 BLDV: 66 MMHG (ref 40–50)
PH BLDV: 7.34 [PH] (ref 7.35–7.45)
PLATELET # BLD AUTO: 272 K/UL (ref 135–450)
PMV BLD AUTO: 8.6 FL (ref 5–10.5)
PO2 BLDV: 38 MMHG
POTASSIUM SERPL-SCNC: 3.8 MMOL/L (ref 3.5–5.1)
PROT SERPL-MCNC: 6.5 G/DL (ref 6.4–8.2)
RBC # BLD AUTO: 5.14 M/UL (ref 4.2–5.9)
SAO2 % BLDV: 65 %
SARS-COV-2 RDRP RESP QL NAA+PROBE: NOT DETECTED
SODIUM SERPL-SCNC: 142 MMOL/L (ref 136–145)
TROPONIN, HIGH SENSITIVITY: 20 NG/L (ref 0–22)
WBC # BLD AUTO: 14 K/UL (ref 4–11)

## 2025-01-14 PROCEDURE — 93005 ELECTROCARDIOGRAM TRACING: CPT | Performed by: GENERAL ACUTE CARE HOSPITAL

## 2025-01-14 PROCEDURE — 87502 INFLUENZA DNA AMP PROBE: CPT

## 2025-01-14 PROCEDURE — 84439 ASSAY OF FREE THYROXINE: CPT

## 2025-01-14 PROCEDURE — 85025 COMPLETE CBC W/AUTO DIFF WBC: CPT

## 2025-01-14 PROCEDURE — 70498 CT ANGIOGRAPHY NECK: CPT

## 2025-01-14 PROCEDURE — 70450 CT HEAD/BRAIN W/O DYE: CPT

## 2025-01-14 PROCEDURE — 6360000004 HC RX CONTRAST MEDICATION: Performed by: INTERNAL MEDICINE

## 2025-01-14 PROCEDURE — 99285 EMERGENCY DEPT VISIT HI MDM: CPT

## 2025-01-14 PROCEDURE — 87635 SARS-COV-2 COVID-19 AMP PRB: CPT

## 2025-01-14 PROCEDURE — 80053 COMPREHEN METABOLIC PANEL: CPT

## 2025-01-14 PROCEDURE — 72125 CT NECK SPINE W/O DYE: CPT

## 2025-01-14 PROCEDURE — 71101 X-RAY EXAM UNILAT RIBS/CHEST: CPT

## 2025-01-14 PROCEDURE — 82803 BLOOD GASES ANY COMBINATION: CPT

## 2025-01-14 PROCEDURE — 84484 ASSAY OF TROPONIN QUANT: CPT

## 2025-01-14 PROCEDURE — 84443 ASSAY THYROID STIM HORMONE: CPT

## 2025-01-14 PROCEDURE — 83036 HEMOGLOBIN GLYCOSYLATED A1C: CPT

## 2025-01-14 PROCEDURE — 1200000000 HC SEMI PRIVATE

## 2025-01-14 PROCEDURE — 83880 ASSAY OF NATRIURETIC PEPTIDE: CPT

## 2025-01-14 RX ORDER — ACETAMINOPHEN 500 MG
1000 TABLET ORAL EVERY 8 HOURS PRN
Status: DISCONTINUED | OUTPATIENT
Start: 2025-01-14 | End: 2025-01-23 | Stop reason: HOSPADM

## 2025-01-14 RX ORDER — POLYETHYLENE GLYCOL 3350 17 G/17G
17 POWDER, FOR SOLUTION ORAL DAILY PRN
Status: DISCONTINUED | OUTPATIENT
Start: 2025-01-14 | End: 2025-01-23 | Stop reason: HOSPADM

## 2025-01-14 RX ORDER — ONDANSETRON 2 MG/ML
4 INJECTION INTRAMUSCULAR; INTRAVENOUS EVERY 6 HOURS PRN
Status: DISCONTINUED | OUTPATIENT
Start: 2025-01-14 | End: 2025-01-23 | Stop reason: HOSPADM

## 2025-01-14 RX ORDER — HYDROCHLOROTHIAZIDE 25 MG/1
25 TABLET ORAL EVERY MORNING
Status: DISCONTINUED | OUTPATIENT
Start: 2025-01-15 | End: 2025-01-16

## 2025-01-14 RX ORDER — ONDANSETRON 4 MG/1
4 TABLET, ORALLY DISINTEGRATING ORAL ONCE
Status: DISCONTINUED | OUTPATIENT
Start: 2025-01-14 | End: 2025-01-23 | Stop reason: HOSPADM

## 2025-01-14 RX ORDER — SODIUM CHLORIDE 0.9 % (FLUSH) 0.9 %
5-40 SYRINGE (ML) INJECTION PRN
Status: DISCONTINUED | OUTPATIENT
Start: 2025-01-14 | End: 2025-01-23 | Stop reason: HOSPADM

## 2025-01-14 RX ORDER — IOPAMIDOL 755 MG/ML
44 INJECTION, SOLUTION INTRAVASCULAR
Status: COMPLETED | OUTPATIENT
Start: 2025-01-14 | End: 2025-01-14

## 2025-01-14 RX ORDER — SODIUM CHLORIDE 9 MG/ML
INJECTION, SOLUTION INTRAVENOUS PRN
Status: DISCONTINUED | OUTPATIENT
Start: 2025-01-14 | End: 2025-01-23 | Stop reason: HOSPADM

## 2025-01-14 RX ORDER — ATORVASTATIN CALCIUM 40 MG/1
40 TABLET, FILM COATED ORAL DAILY
Status: DISCONTINUED | OUTPATIENT
Start: 2025-01-15 | End: 2025-01-23 | Stop reason: HOSPADM

## 2025-01-14 RX ORDER — ASPIRIN 81 MG/1
81 TABLET, CHEWABLE ORAL DAILY
Status: DISCONTINUED | OUTPATIENT
Start: 2025-01-15 | End: 2025-01-15

## 2025-01-14 RX ORDER — ATENOLOL 25 MG/1
12.5 TABLET ORAL DAILY
Status: DISCONTINUED | OUTPATIENT
Start: 2025-01-15 | End: 2025-01-16

## 2025-01-14 RX ORDER — POTASSIUM CHLORIDE 1500 MG/1
20 TABLET, EXTENDED RELEASE ORAL DAILY
Status: ON HOLD | COMMUNITY
End: 2025-01-23 | Stop reason: HOSPADM

## 2025-01-14 RX ORDER — LEVOTHYROXINE SODIUM 50 UG/1
50 TABLET ORAL
Status: DISCONTINUED | OUTPATIENT
Start: 2025-01-15 | End: 2025-01-23 | Stop reason: HOSPADM

## 2025-01-14 RX ORDER — ENOXAPARIN SODIUM 100 MG/ML
40 INJECTION SUBCUTANEOUS DAILY
Status: DISCONTINUED | OUTPATIENT
Start: 2025-01-15 | End: 2025-01-23 | Stop reason: HOSPADM

## 2025-01-14 RX ORDER — ONDANSETRON 4 MG/1
4 TABLET, ORALLY DISINTEGRATING ORAL EVERY 8 HOURS PRN
Status: DISCONTINUED | OUTPATIENT
Start: 2025-01-14 | End: 2025-01-23 | Stop reason: HOSPADM

## 2025-01-14 RX ORDER — SODIUM CHLORIDE 0.9 % (FLUSH) 0.9 %
5-40 SYRINGE (ML) INJECTION EVERY 12 HOURS SCHEDULED
Status: DISCONTINUED | OUTPATIENT
Start: 2025-01-14 | End: 2025-01-23 | Stop reason: HOSPADM

## 2025-01-14 RX ORDER — ASPIRIN 325 MG
325 TABLET ORAL ONCE
Status: COMPLETED | OUTPATIENT
Start: 2025-01-14 | End: 2025-01-15

## 2025-01-14 RX ADMIN — IOPAMIDOL 44 ML: 755 INJECTION, SOLUTION INTRAVENOUS at 22:27

## 2025-01-14 ASSESSMENT — PAIN SCALES - GENERAL
PAINLEVEL_OUTOF10: 0
PAINLEVEL_OUTOF10: 0

## 2025-01-14 ASSESSMENT — LIFESTYLE VARIABLES
HOW MANY STANDARD DRINKS CONTAINING ALCOHOL DO YOU HAVE ON A TYPICAL DAY: PATIENT DOES NOT DRINK
HOW OFTEN DO YOU HAVE A DRINK CONTAINING ALCOHOL: NEVER

## 2025-01-14 ASSESSMENT — PAIN - FUNCTIONAL ASSESSMENT: PAIN_FUNCTIONAL_ASSESSMENT: NONE - DENIES PAIN

## 2025-01-14 NOTE — ED TRIAGE NOTES
Fall 3pm today. Family witnessed right sided facial droop and slurred speech. Symptoms resolved within 30 minutes. Pt appears at baseline on arrival.

## 2025-01-14 NOTE — TELEPHONE ENCOUNTER
Pt's daughter Alma Rosa called requesting to speak with a nurse about pt's ASA. He saw FL yesterday and it was discussed to d/c ASA. She says she forgot to mention during visit the reasons why he is on the medication and wants to be sure that FL wants him to d/c.     [phone]

## 2025-01-14 NOTE — TELEPHONE ENCOUNTER
Spoke with patient's daughter, patient was put on aspirin for TIAs and vision los. She is wondering if he should continue aspirin.

## 2025-01-15 ENCOUNTER — APPOINTMENT (OUTPATIENT)
Dept: CT IMAGING | Age: 89
DRG: 065 | End: 2025-01-15
Payer: MEDICARE

## 2025-01-15 ENCOUNTER — APPOINTMENT (OUTPATIENT)
Dept: MRI IMAGING | Age: 89
DRG: 065 | End: 2025-01-15
Payer: MEDICARE

## 2025-01-15 PROBLEM — E04.1 THYROID NODULE: Status: ACTIVE | Noted: 2025-01-15

## 2025-01-15 LAB
ANION GAP SERPL CALCULATED.3IONS-SCNC: 11 MMOL/L (ref 3–16)
ANION GAP SERPL CALCULATED.3IONS-SCNC: 7 MMOL/L (ref 3–16)
ANISOCYTOSIS BLD QL SMEAR: ABNORMAL
BASOPHILS # BLD: 0.1 K/UL (ref 0–0.2)
BASOPHILS # BLD: 0.2 K/UL (ref 0–0.2)
BASOPHILS NFR BLD: 0.9 %
BASOPHILS NFR BLD: 2 %
BUN SERPL-MCNC: 20 MG/DL (ref 7–20)
BUN SERPL-MCNC: 21 MG/DL (ref 7–20)
CALCIUM SERPL-MCNC: 9.1 MG/DL (ref 8.3–10.6)
CALCIUM SERPL-MCNC: 9.2 MG/DL (ref 8.3–10.6)
CHLORIDE SERPL-SCNC: 100 MMOL/L (ref 99–110)
CHLORIDE SERPL-SCNC: 102 MMOL/L (ref 99–110)
CHOLEST SERPL-MCNC: 98 MG/DL (ref 0–199)
CO2 SERPL-SCNC: 29 MMOL/L (ref 21–32)
CO2 SERPL-SCNC: 30 MMOL/L (ref 21–32)
CREAT SERPL-MCNC: 0.9 MG/DL (ref 0.8–1.3)
CREAT SERPL-MCNC: 0.9 MG/DL (ref 0.8–1.3)
DEPRECATED RDW RBC AUTO: 21.2 % (ref 12.4–15.4)
DEPRECATED RDW RBC AUTO: 21.4 % (ref 12.4–15.4)
EKG ATRIAL RATE: 60 BPM
EKG DIAGNOSIS: NORMAL
EKG P AXIS: 40 DEGREES
EKG P-R INTERVAL: 172 MS
EKG Q-T INTERVAL: 456 MS
EKG QRS DURATION: 116 MS
EKG QTC CALCULATION (BAZETT): 456 MS
EKG R AXIS: -41 DEGREES
EKG T AXIS: 48 DEGREES
EKG VENTRICULAR RATE: 60 BPM
EOSINOPHIL # BLD: 0.2 K/UL (ref 0–0.6)
EOSINOPHIL # BLD: 0.4 K/UL (ref 0–0.6)
EOSINOPHIL NFR BLD: 2 %
EOSINOPHIL NFR BLD: 2.8 %
EST. AVERAGE GLUCOSE BLD GHB EST-MCNC: 108.3 MG/DL
GFR SERPLBLD CREATININE-BSD FMLA CKD-EPI: 77 ML/MIN/{1.73_M2}
GFR SERPLBLD CREATININE-BSD FMLA CKD-EPI: 77 ML/MIN/{1.73_M2}
GLUCOSE BLD-MCNC: 124 MG/DL (ref 70–99)
GLUCOSE SERPL-MCNC: 109 MG/DL (ref 70–99)
GLUCOSE SERPL-MCNC: 120 MG/DL (ref 70–99)
HBA1C MFR BLD: 5.4 %
HCT VFR BLD AUTO: 46.8 % (ref 40.5–52.5)
HCT VFR BLD AUTO: 49.1 % (ref 40.5–52.5)
HDLC SERPL-MCNC: 32 MG/DL (ref 40–60)
HGB BLD-MCNC: 14.6 G/DL (ref 13.5–17.5)
HGB BLD-MCNC: 15 G/DL (ref 13.5–17.5)
LDLC SERPL CALC-MCNC: 50 MG/DL
LYMPHOCYTES # BLD: 0.8 K/UL (ref 1–5.1)
LYMPHOCYTES # BLD: 1.6 K/UL (ref 1–5.1)
LYMPHOCYTES NFR BLD: 12.4 %
LYMPHOCYTES NFR BLD: 7 %
MACROCYTES BLD QL SMEAR: ABNORMAL
MCH RBC QN AUTO: 29.1 PG (ref 26–34)
MCH RBC QN AUTO: 29.2 PG (ref 26–34)
MCHC RBC AUTO-ENTMCNC: 30.6 G/DL (ref 31–36)
MCHC RBC AUTO-ENTMCNC: 31.1 G/DL (ref 31–36)
MCV RBC AUTO: 93.6 FL (ref 80–100)
MCV RBC AUTO: 95.4 FL (ref 80–100)
MICROCYTES BLD QL SMEAR: ABNORMAL
MONOCYTES # BLD: 1.1 K/UL (ref 0–1.3)
MONOCYTES # BLD: 1.2 K/UL (ref 0–1.3)
MONOCYTES NFR BLD: 9 %
MONOCYTES NFR BLD: 9.5 %
MYELOCYTES NFR BLD MANUAL: 1 %
NEUTROPHILS # BLD: 9.4 K/UL (ref 1.7–7.7)
NEUTROPHILS # BLD: 9.5 K/UL (ref 1.7–7.7)
NEUTROPHILS NFR BLD: 74.4 %
NEUTROPHILS NFR BLD: 79 %
OVALOCYTES BLD QL SMEAR: ABNORMAL
PERFORMED ON: ABNORMAL
PLATELET # BLD AUTO: 200 K/UL (ref 135–450)
PLATELET # BLD AUTO: 243 K/UL (ref 135–450)
PLATELET BLD QL SMEAR: ADEQUATE
PMV BLD AUTO: 8.5 FL (ref 5–10.5)
PMV BLD AUTO: 8.7 FL (ref 5–10.5)
POLYCHROMASIA BLD QL SMEAR: ABNORMAL
POTASSIUM SERPL-SCNC: 3.8 MMOL/L (ref 3.5–5.1)
POTASSIUM SERPL-SCNC: 3.8 MMOL/L (ref 3.5–5.1)
RBC # BLD AUTO: 5 M/UL (ref 4.2–5.9)
RBC # BLD AUTO: 5.14 M/UL (ref 4.2–5.9)
SLIDE REVIEW: ABNORMAL
SODIUM SERPL-SCNC: 139 MMOL/L (ref 136–145)
SODIUM SERPL-SCNC: 140 MMOL/L (ref 136–145)
T4 FREE SERPL-MCNC: 1 NG/DL (ref 0.9–1.8)
TRIGL SERPL-MCNC: 78 MG/DL (ref 0–150)
TROPONIN, HIGH SENSITIVITY: 18 NG/L (ref 0–22)
TSH SERPL DL<=0.005 MIU/L-ACNC: 5.16 UIU/ML (ref 0.27–4.2)
VLDLC SERPL CALC-MCNC: 16 MG/DL
WBC # BLD AUTO: 11.8 K/UL (ref 4–11)
WBC # BLD AUTO: 12.8 K/UL (ref 4–11)

## 2025-01-15 PROCEDURE — 97535 SELF CARE MNGMENT TRAINING: CPT

## 2025-01-15 PROCEDURE — 2500000003 HC RX 250 WO HCPCS: Performed by: INTERNAL MEDICINE

## 2025-01-15 PROCEDURE — 94761 N-INVAS EAR/PLS OXIMETRY MLT: CPT

## 2025-01-15 PROCEDURE — 6370000000 HC RX 637 (ALT 250 FOR IP): Performed by: INTERNAL MEDICINE

## 2025-01-15 PROCEDURE — 85730 THROMBOPLASTIN TIME PARTIAL: CPT

## 2025-01-15 PROCEDURE — 97166 OT EVAL MOD COMPLEX 45 MIN: CPT

## 2025-01-15 PROCEDURE — 80061 LIPID PANEL: CPT

## 2025-01-15 PROCEDURE — 1200000000 HC SEMI PRIVATE

## 2025-01-15 PROCEDURE — 70450 CT HEAD/BRAIN W/O DYE: CPT

## 2025-01-15 PROCEDURE — 70551 MRI BRAIN STEM W/O DYE: CPT

## 2025-01-15 PROCEDURE — 85025 COMPLETE CBC W/AUTO DIFF WBC: CPT

## 2025-01-15 PROCEDURE — 97162 PT EVAL MOD COMPLEX 30 MIN: CPT

## 2025-01-15 PROCEDURE — 85732 THROMBOPLASTIN TIME PARTIAL: CPT

## 2025-01-15 PROCEDURE — 97530 THERAPEUTIC ACTIVITIES: CPT

## 2025-01-15 PROCEDURE — 70498 CT ANGIOGRAPHY NECK: CPT

## 2025-01-15 PROCEDURE — 92610 EVALUATE SWALLOWING FUNCTION: CPT

## 2025-01-15 PROCEDURE — 6360000002 HC RX W HCPCS: Performed by: INTERNAL MEDICINE

## 2025-01-15 PROCEDURE — 2700000000 HC OXYGEN THERAPY PER DAY

## 2025-01-15 PROCEDURE — 36415 COLL VENOUS BLD VENIPUNCTURE: CPT

## 2025-01-15 PROCEDURE — 80048 BASIC METABOLIC PNL TOTAL CA: CPT

## 2025-01-15 PROCEDURE — 99223 1ST HOSP IP/OBS HIGH 75: CPT | Performed by: INTERNAL MEDICINE

## 2025-01-15 PROCEDURE — 93010 ELECTROCARDIOGRAM REPORT: CPT | Performed by: INTERNAL MEDICINE

## 2025-01-15 PROCEDURE — 84484 ASSAY OF TROPONIN QUANT: CPT

## 2025-01-15 PROCEDURE — 85670 THROMBIN TIME PLASMA: CPT

## 2025-01-15 PROCEDURE — 6360000004 HC RX CONTRAST MEDICATION: Performed by: INTERNAL MEDICINE

## 2025-01-15 RX ORDER — IOPAMIDOL 755 MG/ML
75 INJECTION, SOLUTION INTRAVASCULAR
Status: COMPLETED | OUTPATIENT
Start: 2025-01-15 | End: 2025-01-15

## 2025-01-15 RX ORDER — ASPIRIN 81 MG/1
324 TABLET, CHEWABLE ORAL DAILY
Status: DISCONTINUED | OUTPATIENT
Start: 2025-01-16 | End: 2025-01-16

## 2025-01-15 RX ADMIN — ASPIRIN 81 MG: 81 TABLET, CHEWABLE ORAL at 11:07

## 2025-01-15 RX ADMIN — SODIUM CHLORIDE, PRESERVATIVE FREE 10 ML: 5 INJECTION INTRAVENOUS at 11:07

## 2025-01-15 RX ADMIN — LEVOTHYROXINE SODIUM 50 MCG: 0.05 TABLET ORAL at 06:36

## 2025-01-15 RX ADMIN — HYDROCHLOROTHIAZIDE 25 MG: 25 TABLET ORAL at 11:07

## 2025-01-15 RX ADMIN — SODIUM CHLORIDE, PRESERVATIVE FREE 10 ML: 5 INJECTION INTRAVENOUS at 00:44

## 2025-01-15 RX ADMIN — ASPIRIN 325 MG: 325 TABLET ORAL at 00:43

## 2025-01-15 RX ADMIN — ATORVASTATIN CALCIUM 40 MG: 40 TABLET, FILM COATED ORAL at 11:07

## 2025-01-15 RX ADMIN — IOPAMIDOL 75 ML: 755 INJECTION, SOLUTION INTRAVENOUS at 17:26

## 2025-01-15 RX ADMIN — ATENOLOL 12.5 MG: 25 TABLET ORAL at 11:07

## 2025-01-15 RX ADMIN — ENOXAPARIN SODIUM 40 MG: 100 INJECTION SUBCUTANEOUS at 11:07

## 2025-01-15 RX ADMIN — SODIUM CHLORIDE, PRESERVATIVE FREE 10 ML: 5 INJECTION INTRAVENOUS at 21:15

## 2025-01-15 ASSESSMENT — PAIN SCALES - GENERAL
PAINLEVEL_OUTOF10: 0
PAINLEVEL_OUTOF10: 0

## 2025-01-15 NOTE — CARE COORDINATION
Case Management Assessment  Initial Evaluation    Date/Time of Evaluation: 1/15/2025 12:36 PM  Assessment Completed by: ROSELINE Wallace    If patient is discharged prior to next notation, then this note serves as note for discharge by case management.    Patient Name: Milton Victor                   YOB: 1925  Diagnosis: Transient alteration of awareness [R40.4]  TIA (transient ischemic attack) [G45.9]  Closed head injury, initial encounter [S09.90XA]  Fall, initial encounter [W19.XXXA]                   Date / Time: 1/14/2025  5:13 PM    Patient Admission Status: Inpatient   Readmission Risk (Low < 19, Mod (19-27), High > 27): Readmission Risk Score: 11.3    Current PCP: Yariel José MD  PCP verified by CM? (P) Yes    Chart Reviewed: Yes      History Provided by: (P) Patient  Patient Orientation: (P) Alert and Oriented, Person, Place    Patient Cognition: (P) Alert    Hospitalization in the last 30 days (Readmission):  No    If yes, Readmission Assessment in  Navigator will be completed.    Advance Directives:      Code Status: Full Code   Patient's Primary Decision Maker is: (P) Legal Next of Kin    Primary Decision Maker: Dafne Newman - [place] - [phone]    Primary Decision Maker: Alma Rosa Cosme - [place] - [phone]    Discharge Planning:    Patient lives with: (P) Alone Type of Home: (P) House  Primary Care Giver: (P) Self  Patient Support Systems include: (P) Family Members   Current Financial resources: (P) None  Current community resources: (P) None  Current services prior to admission: (P) Durable Medical Equipment            Current DME: (P) Walker            Type of Home Care services:  (P) PT, OT, Nursing Services    ADLS  Prior functional level: (P) Independent in ADLs/IADLs, Mobility  Current functional level: (P) Independent in ADLs/IADLs, Mobility    PT AM-PAC: 21 /24  OT AM-PAC: 20 /24    Family can provide assistance at DC: (P) Yes  Would you like Case

## 2025-01-15 NOTE — PLAN OF CARE
TELESTROKE PLAN OF CARE:    Called by neurology re: Mr Milton Victor, a 89yo M admitted for fall, noted to have multiple episodes over the last few days with transient changes to his R side involving R sided weakness.      He went to sleep for a nap and then awoke at 4pm with dysarhria, some RFP, and RUE drift in the setting of /70.  BG was within normal limits.  Formal strength testing at the bedside reportedly normal.  He obtained a CTH which showed a R cerebellar mass c/f meningioma and a CTA what showed mild-mod athero in ant and post circulation but no LVO or critical stenosis.     Given that the patient has full strength on formal bedside testing by neurology and is able to pass a bedside swallow test, his sx are considered non disabling.  This does not mean he did not have a stroke, but rather that his sx are not severe enough to warrant the risks of thrombolytics.  I would feel uncomfortable treating him given his recurrent sx anyways without an MRI of the brain to rule out subacute infarction (he would be out of window by that time frame).    Recs:  - No acute reperfusion therapy  - OK to load with anitplt medication if no other contraindication (ASA 325mg)  - Allow permissive HTN x24hrs up to 220/120  - Consider sz w/u, particularly if routine MRI is negative given meningioma.    Aileen Wyatt MD

## 2025-01-15 NOTE — CONSULTS
Neurology Consult Note  Reason for Consult: TIA    Chief complaint: I fell    Dr José, Yariel Graff,* asked me to see Milton Victor in consultation for evaluation of TIA    History of Present Illness:  Milton Victor is a 89 y.o. male who presents with a fall and concern for stroke.    I obtained my information in part from the patient, supplemented by chart review.  He does have some degree of dementia at baseline which limits information gathering.      The patient tells me that he had fallen and bumped the R side of his head.  Doesn't think that he lost consciousness.  He sort of recalls falling but otherwise is unable to provide much detail as to why he is in the hospital.      Per previous notes, he did in fact have a fall outside of a restaurant.  He seemed OK and was taken home.  At one point he reportedly was feeling nauseated like he was about to vomit.  His daughter was concerned that he was slurring his speech and had some R sided facial weakness for no more than 30 minutes.  He was transported to the ED to be evaluated.      Currently he feels OK.  He has no specific neurologic complaints.      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

## 2025-01-15 NOTE — CODE DOCUMENTATION
Dr. Thornton off the phone with  stroke team. Due to risk of TNK it is not going to be given. See new orders. Dr. Thornton updated pt and verbalized to pt she would update family as well. Pt leaving room to CT scanner with primary RN and Dr. Thornton.

## 2025-01-15 NOTE — CODE DOCUMENTATION
Code Stroke called by Dr. Thornton neuro. Pt has been sleeping since 1400. When pt woke up pt is now having slurred speech, R facial droop, and a right arm droop which is all new. Baseline pt has a R facial droop baseline that is worse when pt is eating but is more significant now. Dr. Thornton on phone with  stroke team.

## 2025-01-15 NOTE — H&P
[facility]  History and Physical      CHIEF COMPLAINT:  I fell    History of Present Illness: This is a 89-year-old white male with a history of mild dementia, hypertension, hyperlipidemia who presented to the emergency room yesterday after a fall and approximately 30 minutes of right-sided facial droop and difficulty with speech.  The family is not here currently to give more history and the patient does not remember the event very well other than falling.    Currently sitting up in bedside chair and is at his mental status baseline.  He has no complaints and is resting comfortably.  No new problems noted overnight.  Nursing at the bedside.  Bedside swallow eval to be completed shortly.    Patient denies chest pain or shortness of breath denies cough or sputum.  He is currently on 1 L of oxygen and has been since he was in the emergency room last night.  Nursing will work to wean this today.        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)     Polycythemia          Past Surgical History:   Procedure Laterality Date    FRACTURE SURGERY      left ankle fracture    MASTOID SURGERY      Childhood    TOTAL KNEE ARTHROPLASTY Left     Partial       Medications Prior to Admission:    Medications Prior to Admission: potassium chloride (KLOR-CON M) 20 MEQ extended release tablet, Take 1 tablet by mouth daily  fluticasone (FLONASE) 50 MCG/ACT nasal spray, USE 1 SPRAY INTO EACH NOSTRIL TWICE A DAY (Patient taking differently: 1 spray by Nasal route 2 times daily)  levothyroxine (SYNTHROID) 50 MCG tablet, Take 1 tablet by mouth daily  atenolol (TENORMIN) 25 MG tablet, Take 0.5 tablets by mouth daily  hydroCHLOROthiazide (HYDRODIURIL) 25 MG tablet, Take 1 tablet by mouth every morning  atorvastatin (LIPITOR) 40 MG tablet,

## 2025-01-15 NOTE — ED PROVIDER NOTES
**ADVANCED PRACTICE PROVIDER, I HAVE EVALUATED THIS PATIENT**        [facility]  EMERGENCY DEPARTMENT ENCOUNTER      Pt Name: Milton Victor  MRN:[id number]  Birthdate 10/22/1925  Date of evaluation: 1/14/2025  Provider: NAVEEN Voss CNP  Note Started: 8:27 PM EST 1/14/25        Chief Complaint:    Chief Complaint   Patient presents with    Fall     Fall 3pm today. Family witnessed right sided facial droop and slurred speech. Symptoms resolved within 30 minutes. Pt appears at baseline on arrival.          Nursing Notes, Past Medical Hx, Past Surgical Hx, Social Hx, Allergies, and Family Hx were all reviewed and agreed with or any disagreements were addressed in the HPI.    HPI: (Location, Duration, Timing, Severity, Quality, Assoc Sx, Context, Modifying factors)    History From: Patient/daughter  Limitations to history : None    Social Determinants Significantly Affecting Health : None    Chief Complaint-    This is a  89 y.o. male who presents to the emergency department today via private car for evaluation.  Patient lives at home by himself.  Per family, patient was leaving a restaurant, sustained a mechanical fall and struck the right side of his head.  There was no loss of consciousness.  Patient was assisted up after the fall, family states that he seemed \"fine\", he was driven home.  He is not anticoagulated but was previously taking a baby aspirin up until yesterday.  Patient had a routine cardiology visit yesterday, was advised that he no longer needed to take aspirin.  Family states that prior to arrival patient was sitting in his recliner and reported feeling nauseated.  Daughter states that she went to get a trash can because she thought patient was going to vomit, states that she noticed that patient's speech was somewhat slurred.  Daughter states she believes that patient had a right-sided facial droop as well.  She states that symptoms lasted 15 to 20 minutes.  No history of CVAs or

## 2025-01-15 NOTE — DISCHARGE INSTR - COC
Continuity of Care Form    Patient Name: Milton Victor   :  10/22/1925  MRN:  [id number]    Admit date:  2025  Discharge date:  25    Code Status Order: Full Code   Advance Directives:   Advance Care Flowsheet Documentation             Admitting Physician:  Yariel José MD  PCP: Yariel José MD    Discharging Nurse: JANET  Discharging Hospital Unit/Room#: D1E-6844/4268-01  Discharging Unit Phone Number: [phone]    Emergency Contact:   Extended Emergency Contact Information  Primary Emergency Contact: Dafne Newman  Address: [place]           [address]  Home Phone: [phone]  Work Phone: [phone]  Mobile Phone: [phone]  Relation: Child  Secondary Emergency Contact: Alma Rosa Cosme  Home Phone: [phone]  Relation: Child    Past Surgical History:  Past Surgical History:   Procedure Laterality Date    FRACTURE SURGERY      left ankle fracture    MASTOID SURGERY      Childhood    TOTAL KNEE ARTHROPLASTY Left     Partial       Immunization History:   Immunization History   Administered Date(s) Administered    COVID-19, MODERNA, (age 12y+), IM, 50mcg/0.5mL 10/10/2023, 2024    COVID-19, PFIZER Bivalent, DO NOT Dilute, (age 12y+), IM, 30 mcg/0.3 mL 10/11/2022    COVID-19, PFIZER PURPLE top, DILUTE for use, (age 12 y+), 30mcg/0.3mL 2021, 02/10/2021, 10/25/2021    Influenza, FLUAD, (age 65 y+), IM, Quadv, 0.5mL 2023    Influenza, FLUAD, (age 65 y+), IM, Trivalent PF, 0.5mL 09/10/2024    Td, unspecified formulation 2013       Active Problems:  Patient Active Problem List   Diagnosis Code    Essential hypertension I10    Mitral valve disorder I05.9    Aortic valve disorder I35.9    Flexor tendon laceration of forearm with open wound S56.229A, S51.809A    Mixed hyperlipidemia E78.2    TIA (transient ischemic attack) G45.9    Thyroid nodule E04.1       Isolation/Infection:   Isolation            No Isolation

## 2025-01-15 NOTE — PLAN OF CARE
Problem: Discharge Planning  Goal: Discharge to home or other facility with appropriate resources  Outcome: Progressing  Flowsheets (Taken 1/15/2025 0002)  Discharge to home or other facility with appropriate resources:   Identify barriers to discharge with patient and caregiver   Identify discharge learning needs (meds, wound care, etc)     Problem: Pain  Goal: Verbalizes/displays adequate comfort level or baseline comfort level  Outcome: Progressing     Problem: ABCDS Injury Assessment  Goal: Absence of physical injury  Outcome: Progressing     Problem: Safety - Adult  Goal: Free from fall injury  Outcome: Progressing

## 2025-01-16 ENCOUNTER — APPOINTMENT (OUTPATIENT)
Dept: GENERAL RADIOLOGY | Age: 89
DRG: 065 | End: 2025-01-16
Payer: MEDICARE

## 2025-01-16 ENCOUNTER — APPOINTMENT (OUTPATIENT)
Dept: MRI IMAGING | Age: 89
DRG: 065 | End: 2025-01-16
Payer: MEDICARE

## 2025-01-16 ENCOUNTER — APPOINTMENT (OUTPATIENT)
Age: 89
DRG: 065 | End: 2025-01-16
Attending: INTERNAL MEDICINE
Payer: MEDICARE

## 2025-01-16 LAB
ANION GAP SERPL CALCULATED.3IONS-SCNC: 8 MMOL/L (ref 3–16)
APTT BLD: 31.2 SEC (ref 22.1–36.4)
BASOPHILS # BLD: 0.1 K/UL (ref 0–0.2)
BASOPHILS NFR BLD: 0.8 %
BUN SERPL-MCNC: 18 MG/DL (ref 7–20)
CALCIUM SERPL-MCNC: 9.3 MG/DL (ref 8.3–10.6)
CHLORIDE SERPL-SCNC: 102 MMOL/L (ref 99–110)
CO2 SERPL-SCNC: 32 MMOL/L (ref 21–32)
CREAT SERPL-MCNC: 0.8 MG/DL (ref 0.8–1.3)
DEPRECATED RDW RBC AUTO: 21.2 % (ref 12.4–15.4)
ECHO AO ASC DIAM: 4 CM
ECHO AO ASCENDING AORTA INDEX: 1.87 CM/M2
ECHO AO ROOT DIAM: 3.3 CM
ECHO AO ROOT INDEX: 1.54 CM/M2
ECHO AR MAX VEL PISA: 2.9 M/S
ECHO AV AREA PEAK VELOCITY: 3 CM2
ECHO AV AREA VTI: 3 CM2
ECHO AV AREA/BSA PEAK VELOCITY: 1.4 CM2/M2
ECHO AV AREA/BSA VTI: 1.4 CM2/M2
ECHO AV MEAN GRADIENT: 5 MMHG
ECHO AV MEAN VELOCITY: 1 M/S
ECHO AV PEAK GRADIENT: 10 MMHG
ECHO AV PEAK VELOCITY: 1.6 M/S
ECHO AV REGURGITANT PHT: 570 MS
ECHO AV VELOCITY RATIO: 0.94
ECHO AV VTI: 36.9 CM
ECHO BSA: 2.18 M2
ECHO EST RA PRESSURE: 5 MMHG
ECHO IVC PROX: 2.1 CM
ECHO LA AREA 2C: 22.8 CM2
ECHO LA AREA 4C: 30.3 CM2
ECHO LA MAJOR AXIS: 7.8 CM
ECHO LA MINOR AXIS: 5.9 CM
ECHO LA VOL MOD A2C: 73 ML (ref 18–58)
ECHO LA VOL MOD A4C: 86 ML (ref 18–58)
ECHO LA VOLUME INDEX MOD A2C: 34 ML/M2 (ref 16–34)
ECHO LA VOLUME INDEX MOD A4C: 40 ML/M2 (ref 16–34)
ECHO LV E' LATERAL VELOCITY: 10.3 CM/S
ECHO LV E' SEPTAL VELOCITY: 8.16 CM/S
ECHO LV EDV 3D: 141 ML
ECHO LV EDV A2C: 106 ML
ECHO LV EDV A4C: 108 ML
ECHO LV EDV INDEX 3D: 66 ML/M2
ECHO LV EDV INDEX A4C: 50 ML/M2
ECHO LV EDV NDEX A2C: 50 ML/M2
ECHO LV EF PHYSICIAN: 62 %
ECHO LV EJECTION FRACTION 3D: 64 %
ECHO LV EJECTION FRACTION A2C: 62 %
ECHO LV EJECTION FRACTION A4C: 63 %
ECHO LV EJECTION FRACTION BIPLANE: 62 % (ref 55–100)
ECHO LV ESV 3D: 51 ML
ECHO LV ESV A2C: 41 ML
ECHO LV ESV A4C: 40 ML
ECHO LV ESV INDEX 3D: 24 ML/M2
ECHO LV ESV INDEX A2C: 19 ML/M2
ECHO LV ESV INDEX A4C: 19 ML/M2
ECHO LV FRACTIONAL SHORTENING: 45 % (ref 28–44)
ECHO LV INTERNAL DIMENSION DIASTOLE INDEX: 2.29 CM/M2
ECHO LV INTERNAL DIMENSION DIASTOLIC: 4.9 CM (ref 4.2–5.9)
ECHO LV INTERNAL DIMENSION SYSTOLIC INDEX: 1.26 CM/M2
ECHO LV INTERNAL DIMENSION SYSTOLIC: 2.7 CM
ECHO LV IVSD: 1.1 CM (ref 0.6–1)
ECHO LV MASS 2D: 200.5 G (ref 88–224)
ECHO LV MASS 3D INDEX: 86 G/M2
ECHO LV MASS 3D: 184 G
ECHO LV MASS INDEX 2D: 93.7 G/M2 (ref 49–115)
ECHO LV POSTERIOR WALL DIASTOLIC: 1.1 CM (ref 0.6–1)
ECHO LV RELATIVE WALL THICKNESS RATIO: 0.45
ECHO LVOT AREA: 3.1 CM2
ECHO LVOT AV VTI INDEX: 0.94
ECHO LVOT DIAM: 2 CM
ECHO LVOT MEAN GRADIENT: 5 MMHG
ECHO LVOT PEAK GRADIENT: 9 MMHG
ECHO LVOT PEAK VELOCITY: 1.5 M/S
ECHO LVOT STROKE VOLUME INDEX: 51.1 ML/M2
ECHO LVOT SV: 109.3 ML
ECHO LVOT VTI: 34.8 CM
ECHO MV A VELOCITY: 0.77 M/S
ECHO MV AREA VTI: 3.9 CM2
ECHO MV E DECELERATION TIME (DT): 226 MS
ECHO MV E VELOCITY: 0.67 M/S
ECHO MV E/A RATIO: 0.87
ECHO MV E/E' LATERAL: 6.5
ECHO MV E/E' RATIO (AVERAGED): 7.36
ECHO MV E/E' SEPTAL: 8.21
ECHO MV LVOT VTI INDEX: 0.8
ECHO MV MAX VELOCITY: 0.8 M/S
ECHO MV MEAN GRADIENT: 1 MMHG
ECHO MV MEAN VELOCITY: 0.4 M/S
ECHO MV PEAK GRADIENT: 2 MMHG
ECHO MV VTI: 27.7 CM
ECHO PV MAX VELOCITY: 0.9 M/S
ECHO PV PEAK GRADIENT: 3 MMHG
ECHO RA AREA 4C: 20.9 CM2
ECHO RA END SYSTOLIC VOLUME APICAL 4 CHAMBER INDEX BSA: 23 ML/M2
ECHO RA VOLUME: 49 ML
ECHO RIGHT VENTRICULAR SYSTOLIC PRESSURE (RVSP): 11 MMHG
ECHO RV BASAL DIMENSION: 4.7 CM
ECHO RV FREE WALL PEAK S': 12.4 CM/S
ECHO RV GLOBAL SYSTOLIC STRAIN (GLS): -33.1 %
ECHO RV MID DIMENSION: 3.4 CM
ECHO RV TAPSE: 2.5 CM (ref 1.7–?)
ECHO TV REGURGITANT MAX VELOCITY: 1.19 M/S
ECHO TV REGURGITANT PEAK GRADIENT: 6 MMHG
EOSINOPHIL # BLD: 0.3 K/UL (ref 0–0.6)
EOSINOPHIL NFR BLD: 2.8 %
GFR SERPLBLD CREATININE-BSD FMLA CKD-EPI: 79 ML/MIN/{1.73_M2}
GLUCOSE SERPL-MCNC: 113 MG/DL (ref 70–99)
HCT VFR BLD AUTO: 46 % (ref 40.5–52.5)
HGB BLD-MCNC: 14.1 G/DL (ref 13.5–17.5)
INR PPP: 1 (ref 0.85–1.15)
LYMPHOCYTES # BLD: 1.1 K/UL (ref 1–5.1)
LYMPHOCYTES NFR BLD: 9.9 %
MAGNESIUM SERPL-MCNC: 1.93 MG/DL (ref 1.8–2.4)
MCH RBC QN AUTO: 29.4 PG (ref 26–34)
MCHC RBC AUTO-ENTMCNC: 30.7 G/DL (ref 31–36)
MCV RBC AUTO: 95.7 FL (ref 80–100)
MIXING STUDIES PPP-IMP: NO
MONOCYTES # BLD: 1 K/UL (ref 0–1.3)
MONOCYTES NFR BLD: 9.6 %
NEUTROPHILS # BLD: 8.3 K/UL (ref 1.7–7.7)
NEUTROPHILS NFR BLD: 76.9 %
PLATELET # BLD AUTO: 212 K/UL (ref 135–450)
PMV BLD AUTO: 8.8 FL (ref 5–10.5)
POTASSIUM SERPL-SCNC: 3.5 MMOL/L (ref 3.5–5.1)
PROTHROMBIN TIME: 13.4 SEC (ref 11.9–14.9)
RBC # BLD AUTO: 4.81 M/UL (ref 4.2–5.9)
REASON FOR REJECTION: NORMAL
REJECTED TEST: NORMAL
SODIUM SERPL-SCNC: 142 MMOL/L (ref 136–145)
THROMBIN TIME: 16 SECS (ref 14.8–20)
WBC # BLD AUTO: 10.8 K/UL (ref 4–11)

## 2025-01-16 PROCEDURE — 4A00X4Z MEASUREMENT OF CENTRAL NERVOUS ELECTRICAL ACTIVITY, EXTERNAL APPROACH: ICD-10-PCS | Performed by: PSYCHIATRY & NEUROLOGY

## 2025-01-16 PROCEDURE — 1200000000 HC SEMI PRIVATE

## 2025-01-16 PROCEDURE — 80048 BASIC METABOLIC PNL TOTAL CA: CPT

## 2025-01-16 PROCEDURE — 70551 MRI BRAIN STEM W/O DYE: CPT

## 2025-01-16 PROCEDURE — 71045 X-RAY EXAM CHEST 1 VIEW: CPT

## 2025-01-16 PROCEDURE — 6370000000 HC RX 637 (ALT 250 FOR IP): Performed by: INTERNAL MEDICINE

## 2025-01-16 PROCEDURE — 9990000010 HC NO CHARGE VISIT

## 2025-01-16 PROCEDURE — 2500000003 HC RX 250 WO HCPCS: Performed by: INTERNAL MEDICINE

## 2025-01-16 PROCEDURE — 76376 3D RENDER W/INTRP POSTPROCES: CPT | Performed by: INTERNAL MEDICINE

## 2025-01-16 PROCEDURE — 6370000000 HC RX 637 (ALT 250 FOR IP): Performed by: STUDENT IN AN ORGANIZED HEALTH CARE EDUCATION/TRAINING PROGRAM

## 2025-01-16 PROCEDURE — 76376 3D RENDER W/INTRP POSTPROCES: CPT

## 2025-01-16 PROCEDURE — 94761 N-INVAS EAR/PLS OXIMETRY MLT: CPT

## 2025-01-16 PROCEDURE — 99233 SBSQ HOSP IP/OBS HIGH 50: CPT | Performed by: INTERNAL MEDICINE

## 2025-01-16 PROCEDURE — 92526 ORAL FUNCTION THERAPY: CPT

## 2025-01-16 PROCEDURE — 93306 TTE W/DOPPLER COMPLETE: CPT | Performed by: INTERNAL MEDICINE

## 2025-01-16 PROCEDURE — 97530 THERAPEUTIC ACTIVITIES: CPT

## 2025-01-16 PROCEDURE — 2700000000 HC OXYGEN THERAPY PER DAY

## 2025-01-16 PROCEDURE — 95819 EEG AWAKE AND ASLEEP: CPT

## 2025-01-16 PROCEDURE — 93356 MYOCRD STRAIN IMG SPCKL TRCK: CPT | Performed by: INTERNAL MEDICINE

## 2025-01-16 PROCEDURE — 85025 COMPLETE CBC W/AUTO DIFF WBC: CPT

## 2025-01-16 PROCEDURE — 6370000000 HC RX 637 (ALT 250 FOR IP): Performed by: NURSE PRACTITIONER

## 2025-01-16 PROCEDURE — 2580000003 HC RX 258: Performed by: NURSE PRACTITIONER

## 2025-01-16 PROCEDURE — 83735 ASSAY OF MAGNESIUM: CPT

## 2025-01-16 PROCEDURE — 36415 COLL VENOUS BLD VENIPUNCTURE: CPT

## 2025-01-16 PROCEDURE — 6360000002 HC RX W HCPCS: Performed by: INTERNAL MEDICINE

## 2025-01-16 RX ORDER — SENNOSIDES A AND B 8.6 MG/1
1 TABLET, FILM COATED ORAL DAILY
Status: DISCONTINUED | OUTPATIENT
Start: 2025-01-16 | End: 2025-01-23 | Stop reason: HOSPADM

## 2025-01-16 RX ORDER — CLOPIDOGREL BISULFATE 75 MG/1
75 TABLET ORAL DAILY
Status: DISCONTINUED | OUTPATIENT
Start: 2025-01-16 | End: 2025-01-23 | Stop reason: HOSPADM

## 2025-01-16 RX ORDER — ASPIRIN 81 MG/1
81 TABLET, CHEWABLE ORAL DAILY
Status: DISCONTINUED | OUTPATIENT
Start: 2025-01-17 | End: 2025-01-23 | Stop reason: HOSPADM

## 2025-01-16 RX ORDER — SODIUM CHLORIDE 9 MG/ML
INJECTION, SOLUTION INTRAVENOUS CONTINUOUS
Status: ACTIVE | OUTPATIENT
Start: 2025-01-16 | End: 2025-01-16

## 2025-01-16 RX ADMIN — CLOPIDOGREL BISULFATE 75 MG: 75 TABLET ORAL at 13:59

## 2025-01-16 RX ADMIN — ENOXAPARIN SODIUM 40 MG: 100 INJECTION SUBCUTANEOUS at 08:19

## 2025-01-16 RX ADMIN — LEVOTHYROXINE SODIUM 50 MCG: 0.05 TABLET ORAL at 05:49

## 2025-01-16 RX ADMIN — HYDROCHLOROTHIAZIDE 25 MG: 25 TABLET ORAL at 08:19

## 2025-01-16 RX ADMIN — SENNOSIDES 8.6 MG: 8.6 TABLET, COATED ORAL at 08:54

## 2025-01-16 RX ADMIN — ASPIRIN 324 MG: 81 TABLET, CHEWABLE ORAL at 08:20

## 2025-01-16 RX ADMIN — SODIUM CHLORIDE: 9 INJECTION, SOLUTION INTRAVENOUS at 13:59

## 2025-01-16 RX ADMIN — ATORVASTATIN CALCIUM 40 MG: 40 TABLET, FILM COATED ORAL at 08:20

## 2025-01-16 RX ADMIN — SODIUM CHLORIDE, PRESERVATIVE FREE 10 ML: 5 INJECTION INTRAVENOUS at 20:32

## 2025-01-16 RX ADMIN — ATENOLOL 12.5 MG: 25 TABLET ORAL at 08:20

## 2025-01-16 RX ADMIN — SODIUM CHLORIDE, PRESERVATIVE FREE 10 ML: 5 INJECTION INTRAVENOUS at 08:55

## 2025-01-16 NOTE — PROCEDURES
[facility]          [address] [place] [address]                       ELECTROENCEPHALOGRAM REPORT      PATIENT NAME: VERENICE SIU         : 10/22/1925  MED REC NO: [id number]                      ROOM: [place] [address]  ACCOUNT NO: [id number]                       ADMIT DATE: 2025  PROVIDER: Daniel Luther DO      DATE OF SERVICE:  2025    REFERRING PHYSICIAN:  Johanna Thornton MD    REASON FOR STUDY:  Recurrent events of right-sided weakness, dysarthria.    BRIEF HISTORY AND NEUROLOGIC FINDINGS:  The patient is a 89-year-old male being evaluated for right hemiparesis and dysarthria.  He also has a history of meningioma.    MEDICATIONS:  The patient's medications listed did not include any centrally acting agents.    EEG FINDINGS:  This is a 20-channel digital EEG performed utilizing bipolar and referential montages.  Wakefulness, drowsiness, and brief sleep were obtained during the recording.  During maximal wakefulness, there was a moderate voltage, fairly symmetric, fairly well-regulated and reactive 8 hertz posterior dominant background rhythm.  The anterior background consisted of low-voltage mixed frequencies.  Drowsiness is manifested by attenuation of the waking background rhythms.  Brief sleep is manifested by vertex waves and K-complexes.    Photic stimulation was performed at various flash frequencies and did not evoke a significant posterior driving response.  Hyperventilation exercise was not performed due to the patient's age.    A 1-channel EKG rhythm strip was reviewed and showed some mild bradycardia with heart rates ranging from the upper 40s to upper 50s.    EEG DIAGNOSIS:  Essentially normal awake and asleep EEG.    CLINICAL INTERPRETATION:  No definite epileptiform activity or evidence for focal cerebral dysfunction was present during this recording.  If seizures remain a clinical concern, then a repeat EEG may be of further

## 2025-01-16 NOTE — PLAN OF CARE
Problem: Discharge Planning  Goal: Discharge to home or other facility with appropriate resources  Outcome: Progressing  Flowsheets (Taken 1/15/2025 0915)  Discharge to home or other facility with appropriate resources:   Identify barriers to discharge with patient and caregiver   Arrange for needed discharge resources and transportation as appropriate   Identify discharge learning needs (meds, wound care, etc)     Problem: Pain  Goal: Verbalizes/displays adequate comfort level or baseline comfort level  Outcome: Progressing  Flowsheets (Taken 1/15/2025 0836)  Verbalizes/displays adequate comfort level or baseline comfort level:   Encourage patient to monitor pain and request assistance   Assess pain using appropriate pain scale   Administer analgesics based on type and severity of pain and evaluate response   Implement non-pharmacological measures as appropriate and evaluate response   Consider cultural and social influences on pain and pain management   Notify Licensed Independent Practitioner if interventions unsuccessful or patient reports new pain     Problem: ABCDS Injury Assessment  Goal: Absence of physical injury  Outcome: Progressing  Flowsheets (Taken 1/15/2025 0915)  Absence of Physical Injury: Implement safety measures based on patient assessment     Problem: Safety - Adult  Goal: Free from fall injury  Outcome: Progressing  Flowsheets (Taken 1/15/2025 0915)  Free From Fall Injury: Instruct family/caregiver on patient safety

## 2025-01-17 ENCOUNTER — APPOINTMENT (OUTPATIENT)
Dept: CT IMAGING | Age: 89
DRG: 065 | End: 2025-01-17
Payer: MEDICARE

## 2025-01-17 LAB
ANION GAP SERPL CALCULATED.3IONS-SCNC: 9 MMOL/L (ref 3–16)
BASOPHILS # BLD: 0.1 K/UL (ref 0–0.2)
BASOPHILS NFR BLD: 0.9 %
BUN SERPL-MCNC: 17 MG/DL (ref 7–20)
CALCIUM SERPL-MCNC: 9.5 MG/DL (ref 8.3–10.6)
CHLORIDE SERPL-SCNC: 100 MMOL/L (ref 99–110)
CO2 SERPL-SCNC: 33 MMOL/L (ref 21–32)
CREAT SERPL-MCNC: 0.8 MG/DL (ref 0.8–1.3)
DEPRECATED RDW RBC AUTO: 20.9 % (ref 12.4–15.4)
EOSINOPHIL # BLD: 0.3 K/UL (ref 0–0.6)
EOSINOPHIL NFR BLD: 3.1 %
GFR SERPLBLD CREATININE-BSD FMLA CKD-EPI: 79 ML/MIN/{1.73_M2}
GLUCOSE SERPL-MCNC: 98 MG/DL (ref 70–99)
HCT VFR BLD AUTO: 47.7 % (ref 40.5–52.5)
HGB BLD-MCNC: 14.9 G/DL (ref 13.5–17.5)
LYMPHOCYTES # BLD: 0.9 K/UL (ref 1–5.1)
LYMPHOCYTES NFR BLD: 9.2 %
MAGNESIUM SERPL-MCNC: 1.98 MG/DL (ref 1.8–2.4)
MCH RBC QN AUTO: 29.2 PG (ref 26–34)
MCHC RBC AUTO-ENTMCNC: 31.3 G/DL (ref 31–36)
MCV RBC AUTO: 93.5 FL (ref 80–100)
MONOCYTES # BLD: 0.9 K/UL (ref 0–1.3)
MONOCYTES NFR BLD: 9.1 %
NEUTROPHILS # BLD: 8 K/UL (ref 1.7–7.7)
NEUTROPHILS NFR BLD: 77.7 %
PLATELET # BLD AUTO: 167 K/UL (ref 135–450)
PMV BLD AUTO: 8.3 FL (ref 5–10.5)
POTASSIUM SERPL-SCNC: 3.3 MMOL/L (ref 3.5–5.1)
RBC # BLD AUTO: 5.1 M/UL (ref 4.2–5.9)
SODIUM SERPL-SCNC: 142 MMOL/L (ref 136–145)
WBC # BLD AUTO: 10.4 K/UL (ref 4–11)

## 2025-01-17 PROCEDURE — 71250 CT THORAX DX C-: CPT

## 2025-01-17 PROCEDURE — 1200000000 HC SEMI PRIVATE

## 2025-01-17 PROCEDURE — 97530 THERAPEUTIC ACTIVITIES: CPT

## 2025-01-17 PROCEDURE — 36415 COLL VENOUS BLD VENIPUNCTURE: CPT

## 2025-01-17 PROCEDURE — 97110 THERAPEUTIC EXERCISES: CPT

## 2025-01-17 PROCEDURE — 80048 BASIC METABOLIC PNL TOTAL CA: CPT

## 2025-01-17 PROCEDURE — 97535 SELF CARE MNGMENT TRAINING: CPT

## 2025-01-17 PROCEDURE — 85025 COMPLETE CBC W/AUTO DIFF WBC: CPT

## 2025-01-17 PROCEDURE — 6370000000 HC RX 637 (ALT 250 FOR IP): Performed by: INTERNAL MEDICINE

## 2025-01-17 PROCEDURE — 83735 ASSAY OF MAGNESIUM: CPT

## 2025-01-17 PROCEDURE — 92526 ORAL FUNCTION THERAPY: CPT

## 2025-01-17 PROCEDURE — 51798 US URINE CAPACITY MEASURE: CPT

## 2025-01-17 PROCEDURE — 6360000002 HC RX W HCPCS: Performed by: INTERNAL MEDICINE

## 2025-01-17 PROCEDURE — 6370000000 HC RX 637 (ALT 250 FOR IP): Performed by: NURSE PRACTITIONER

## 2025-01-17 PROCEDURE — 2500000003 HC RX 250 WO HCPCS: Performed by: INTERNAL MEDICINE

## 2025-01-17 PROCEDURE — 99223 1ST HOSP IP/OBS HIGH 75: CPT | Performed by: INTERNAL MEDICINE

## 2025-01-17 RX ORDER — TAMSULOSIN HYDROCHLORIDE 0.4 MG/1
0.4 CAPSULE ORAL DAILY
Status: DISCONTINUED | OUTPATIENT
Start: 2025-01-17 | End: 2025-01-23 | Stop reason: HOSPADM

## 2025-01-17 RX ADMIN — LEVOTHYROXINE SODIUM 50 MCG: 0.05 TABLET ORAL at 05:36

## 2025-01-17 RX ADMIN — CLOPIDOGREL BISULFATE 75 MG: 75 TABLET ORAL at 08:49

## 2025-01-17 RX ADMIN — SENNOSIDES 8.6 MG: 8.6 TABLET, COATED ORAL at 08:49

## 2025-01-17 RX ADMIN — TAMSULOSIN HYDROCHLORIDE 0.4 MG: 0.4 CAPSULE ORAL at 08:49

## 2025-01-17 RX ADMIN — ATORVASTATIN CALCIUM 40 MG: 40 TABLET, FILM COATED ORAL at 08:49

## 2025-01-17 RX ADMIN — POTASSIUM BICARBONATE 40 MEQ: 782 TABLET, EFFERVESCENT ORAL at 11:14

## 2025-01-17 RX ADMIN — SODIUM CHLORIDE, PRESERVATIVE FREE 10 ML: 5 INJECTION INTRAVENOUS at 08:51

## 2025-01-17 RX ADMIN — ASPIRIN 81 MG: 81 TABLET, CHEWABLE ORAL at 08:49

## 2025-01-17 RX ADMIN — ENOXAPARIN SODIUM 40 MG: 100 INJECTION SUBCUTANEOUS at 08:49

## 2025-01-17 NOTE — CONSULTS
Consult Note  Physical Medicine and Rehabilitation    Patient: Milton BARNES [facility]  [id number]  Date: 1/17/2025      Chief Complaint: right side weakness, slurred speech    History of Present Illness/Hospital Course:  Patient is a 89 yo male with pmh of mild dementia, hypertension and hyperlipidemia who initially presented on 1/14/2025 after a fall with fluctuating right side weakness, facial droop and slurred speech. 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 Neurology consulted and and recommending DAPT x 3 weeks followed by full dose ASA or Plavix. Course has been complicated by hypoxia and urinary retention. Currently, patient reports ongoing right side weakness, slurred speech, and difficulty with his mobility. He denies vision changes, tingling/numbness, changes to mental status. Caregiver and grandchildren at the bedside agree he is close to his mental status baseline. Patient is very independent and active at baseline. He is motivated to work with therapies and is interested in coming to ARU    Prior Level of Function:  Independent for mobility with or without RW, ADLs  Active in community/[employer]     Current Level of Function:  Mod-maxA for transfers and ambulating short distance   Anticipate up to max assist for full ADL    Pertinent Social History:  Support: lives alone, has caregiver 6 days/week, 6 hours/day and family frequently visits as well  Home set-up: one level with 2-3 LIZY    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

## 2025-01-17 NOTE — CARE COORDINATION
Chart review done, rounds completed. Pt was recommended for ARU, requested PCP to place consult so Dr. Bal can see, will need precert.   Watching for needs.     Giorgio Boles LMSW, [facility] Social Work Case Management   Phone: [phone]  Fax: [phone]

## 2025-01-17 NOTE — PLAN OF CARE
Problem: Discharge Planning  Goal: Discharge to home or other facility with appropriate resources  1/16/2025 2128 by Luna Oliver RN  Outcome: Progressing  1/16/2025 1521 by Laura Espinoza RN  Outcome: Progressing     Problem: Pain  Goal: Verbalizes/displays adequate comfort level or baseline comfort level  1/16/2025 2128 by Luna Oliver RN  Outcome: Progressing  1/16/2025 1521 by Laura Espinoza RN  Outcome: Progressing     Problem: ABCDS Injury Assessment  Goal: Absence of physical injury  1/16/2025 2128 by Luna Oliver RN  Outcome: Progressing  1/16/2025 1521 by Laura Espinoza RN  Outcome: Progressing     Problem: Safety - Adult  Goal: Free from fall injury  1/16/2025 2128 by Luna Oliver RN  Outcome: Progressing  1/16/2025 1521 by Laura Espinoza RN  Outcome: Progressing

## 2025-01-18 LAB
ANION GAP SERPL CALCULATED.3IONS-SCNC: 8 MMOL/L (ref 3–16)
BASOPHILS # BLD: 0.1 K/UL (ref 0–0.2)
BASOPHILS NFR BLD: 0.9 %
BUN SERPL-MCNC: 17 MG/DL (ref 7–20)
CALCIUM SERPL-MCNC: 9.4 MG/DL (ref 8.3–10.6)
CHLORIDE SERPL-SCNC: 100 MMOL/L (ref 99–110)
CO2 SERPL-SCNC: 31 MMOL/L (ref 21–32)
CREAT SERPL-MCNC: 0.7 MG/DL (ref 0.8–1.3)
DEPRECATED RDW RBC AUTO: 20.5 % (ref 12.4–15.4)
EOSINOPHIL # BLD: 0.3 K/UL (ref 0–0.6)
EOSINOPHIL NFR BLD: 3.1 %
GFR SERPLBLD CREATININE-BSD FMLA CKD-EPI: 83 ML/MIN/{1.73_M2}
GLUCOSE SERPL-MCNC: 94 MG/DL (ref 70–99)
HCT VFR BLD AUTO: 47 % (ref 40.5–52.5)
HGB BLD-MCNC: 14.6 G/DL (ref 13.5–17.5)
LYMPHOCYTES # BLD: 0.7 K/UL (ref 1–5.1)
LYMPHOCYTES NFR BLD: 7.8 %
MCH RBC QN AUTO: 29.1 PG (ref 26–34)
MCHC RBC AUTO-ENTMCNC: 31 G/DL (ref 31–36)
MCV RBC AUTO: 93.9 FL (ref 80–100)
MONOCYTES # BLD: 0.8 K/UL (ref 0–1.3)
MONOCYTES NFR BLD: 8.4 %
NEUTROPHILS # BLD: 7.5 K/UL (ref 1.7–7.7)
NEUTROPHILS NFR BLD: 79.8 %
PLATELET # BLD AUTO: 137 K/UL (ref 135–450)
PMV BLD AUTO: 8.5 FL (ref 5–10.5)
POTASSIUM SERPL-SCNC: 3.6 MMOL/L (ref 3.5–5.1)
RBC # BLD AUTO: 5.01 M/UL (ref 4.2–5.9)
SODIUM SERPL-SCNC: 139 MMOL/L (ref 136–145)
WBC # BLD AUTO: 9.4 K/UL (ref 4–11)

## 2025-01-18 PROCEDURE — 94761 N-INVAS EAR/PLS OXIMETRY MLT: CPT

## 2025-01-18 PROCEDURE — 6370000000 HC RX 637 (ALT 250 FOR IP): Performed by: NURSE PRACTITIONER

## 2025-01-18 PROCEDURE — 6370000000 HC RX 637 (ALT 250 FOR IP): Performed by: INTERNAL MEDICINE

## 2025-01-18 PROCEDURE — 6360000002 HC RX W HCPCS: Performed by: INTERNAL MEDICINE

## 2025-01-18 PROCEDURE — 36415 COLL VENOUS BLD VENIPUNCTURE: CPT

## 2025-01-18 PROCEDURE — 99232 SBSQ HOSP IP/OBS MODERATE 35: CPT | Performed by: STUDENT IN AN ORGANIZED HEALTH CARE EDUCATION/TRAINING PROGRAM

## 2025-01-18 PROCEDURE — 1200000000 HC SEMI PRIVATE

## 2025-01-18 PROCEDURE — 2500000003 HC RX 250 WO HCPCS: Performed by: INTERNAL MEDICINE

## 2025-01-18 PROCEDURE — 85025 COMPLETE CBC W/AUTO DIFF WBC: CPT

## 2025-01-18 PROCEDURE — 51798 US URINE CAPACITY MEASURE: CPT

## 2025-01-18 PROCEDURE — 80048 BASIC METABOLIC PNL TOTAL CA: CPT

## 2025-01-18 PROCEDURE — 2700000000 HC OXYGEN THERAPY PER DAY

## 2025-01-18 RX ADMIN — ASPIRIN 81 MG: 81 TABLET, CHEWABLE ORAL at 10:00

## 2025-01-18 RX ADMIN — SODIUM CHLORIDE, PRESERVATIVE FREE 10 ML: 5 INJECTION INTRAVENOUS at 10:11

## 2025-01-18 RX ADMIN — SODIUM CHLORIDE, PRESERVATIVE FREE 10 ML: 5 INJECTION INTRAVENOUS at 20:22

## 2025-01-18 RX ADMIN — LEVOTHYROXINE SODIUM 50 MCG: 0.05 TABLET ORAL at 05:56

## 2025-01-18 RX ADMIN — SENNOSIDES 8.6 MG: 8.6 TABLET, COATED ORAL at 10:00

## 2025-01-18 RX ADMIN — TAMSULOSIN HYDROCHLORIDE 0.4 MG: 0.4 CAPSULE ORAL at 10:01

## 2025-01-18 RX ADMIN — ENOXAPARIN SODIUM 40 MG: 100 INJECTION SUBCUTANEOUS at 10:09

## 2025-01-18 RX ADMIN — CLOPIDOGREL BISULFATE 75 MG: 75 TABLET ORAL at 10:00

## 2025-01-18 RX ADMIN — ATORVASTATIN CALCIUM 40 MG: 40 TABLET, FILM COATED ORAL at 10:00

## 2025-01-18 ASSESSMENT — PAIN SCALES - GENERAL: PAINLEVEL_OUTOF10: 0

## 2025-01-18 NOTE — PLAN OF CARE
Problem: Discharge Planning  Goal: Discharge to home or other facility with appropriate resources  Outcome: Progressing     Problem: Pain  Goal: Verbalizes/displays adequate comfort level or baseline comfort level  Outcome: Progressing     Problem: ABCDS Injury Assessment  Goal: Absence of physical injury  Outcome: Progressing     Problem: Safety - Adult  Goal: Free from fall injury  Outcome: Progressing     Problem: Skin/Tissue Integrity  Goal: Absence of new skin breakdown  Description: 1.  Monitor for areas of redness and/or skin breakdown  2.  Assess vascular access sites hourly  3.  Every 4-6 hours minimum:  Change oxygen saturation probe site  4.  Every 4-6 hours:  If on nasal continuous positive airway pressure, respiratory therapy assess nares and determine need for appliance change or resting period.  Outcome: Progressing     Problem: Neurosensory - Adult  Goal: Achieves stable or improved neurological status  Outcome: Progressing  Goal: Achieves maximal functionality and self care  Outcome: Progressing     Problem: Respiratory - Adult  Goal: Achieves optimal ventilation and oxygenation  Outcome: Progressing     Problem: Cardiovascular - Adult  Goal: Maintains optimal cardiac output and hemodynamic stability  Outcome: Progressing  Goal: Absence of cardiac dysrhythmias or at baseline  Outcome: Progressing     Problem: Skin/Tissue Integrity - Adult  Goal: Skin integrity remains intact  Outcome: Progressing  Goal: Incisions, wounds, or drain sites healing without S/S of infection  Outcome: Progressing  Goal: Oral mucous membranes remain intact  Outcome: Progressing     Problem: Musculoskeletal - Adult  Goal: Return mobility to safest level of function  Outcome: Progressing  Goal: Maintain proper alignment of affected body part  Outcome: Progressing  Goal: Return ADL status to a safe level of function  Outcome: Progressing     Problem: Gastrointestinal - Adult  Goal: Minimal or absence of nausea and

## 2025-01-18 NOTE — PLAN OF CARE
Problem: Discharge Planning  Goal: Discharge to home or other facility with appropriate resources  1/18/2025 1138 by Dania Rg, RN  Outcome: Progressing

## 2025-01-19 LAB
ANION GAP SERPL CALCULATED.3IONS-SCNC: 9 MMOL/L (ref 3–16)
BASOPHILS # BLD: 0.1 K/UL (ref 0–0.2)
BASOPHILS NFR BLD: 0.9 %
BUN SERPL-MCNC: 19 MG/DL (ref 7–20)
CALCIUM SERPL-MCNC: 9.3 MG/DL (ref 8.3–10.6)
CHLORIDE SERPL-SCNC: 101 MMOL/L (ref 99–110)
CO2 SERPL-SCNC: 33 MMOL/L (ref 21–32)
CREAT SERPL-MCNC: 0.8 MG/DL (ref 0.8–1.3)
DEPRECATED RDW RBC AUTO: 20.5 % (ref 12.4–15.4)
EOSINOPHIL # BLD: 0.3 K/UL (ref 0–0.6)
EOSINOPHIL NFR BLD: 3.3 %
GFR SERPLBLD CREATININE-BSD FMLA CKD-EPI: 79 ML/MIN/{1.73_M2}
GLUCOSE SERPL-MCNC: 101 MG/DL (ref 70–99)
HCT VFR BLD AUTO: 47.4 % (ref 40.5–52.5)
HGB BLD-MCNC: 14.5 G/DL (ref 13.5–17.5)
LYMPHOCYTES # BLD: 0.9 K/UL (ref 1–5.1)
LYMPHOCYTES NFR BLD: 8.9 %
MCH RBC QN AUTO: 28.9 PG (ref 26–34)
MCHC RBC AUTO-ENTMCNC: 30.6 G/DL (ref 31–36)
MCV RBC AUTO: 94.5 FL (ref 80–100)
MONOCYTES # BLD: 0.7 K/UL (ref 0–1.3)
MONOCYTES NFR BLD: 7 %
NEUTROPHILS # BLD: 7.9 K/UL (ref 1.7–7.7)
NEUTROPHILS NFR BLD: 79.9 %
PLATELET # BLD AUTO: 124 K/UL (ref 135–450)
PMV BLD AUTO: 7.9 FL (ref 5–10.5)
POTASSIUM SERPL-SCNC: 3.7 MMOL/L (ref 3.5–5.1)
RBC # BLD AUTO: 5.01 M/UL (ref 4.2–5.9)
SODIUM SERPL-SCNC: 143 MMOL/L (ref 136–145)
WBC # BLD AUTO: 9.9 K/UL (ref 4–11)

## 2025-01-19 PROCEDURE — 6360000002 HC RX W HCPCS: Performed by: INTERNAL MEDICINE

## 2025-01-19 PROCEDURE — 6370000000 HC RX 637 (ALT 250 FOR IP): Performed by: NURSE PRACTITIONER

## 2025-01-19 PROCEDURE — 6370000000 HC RX 637 (ALT 250 FOR IP): Performed by: INTERNAL MEDICINE

## 2025-01-19 PROCEDURE — 99232 SBSQ HOSP IP/OBS MODERATE 35: CPT | Performed by: STUDENT IN AN ORGANIZED HEALTH CARE EDUCATION/TRAINING PROGRAM

## 2025-01-19 PROCEDURE — 85025 COMPLETE CBC W/AUTO DIFF WBC: CPT

## 2025-01-19 PROCEDURE — 2500000003 HC RX 250 WO HCPCS: Performed by: INTERNAL MEDICINE

## 2025-01-19 PROCEDURE — 6370000000 HC RX 637 (ALT 250 FOR IP): Performed by: STUDENT IN AN ORGANIZED HEALTH CARE EDUCATION/TRAINING PROGRAM

## 2025-01-19 PROCEDURE — 36415 COLL VENOUS BLD VENIPUNCTURE: CPT

## 2025-01-19 PROCEDURE — 80048 BASIC METABOLIC PNL TOTAL CA: CPT

## 2025-01-19 PROCEDURE — 1200000000 HC SEMI PRIVATE

## 2025-01-19 RX ORDER — HYDROXYZINE HYDROCHLORIDE 10 MG/1
10 TABLET, FILM COATED ORAL 3 TIMES DAILY PRN
Status: DISCONTINUED | OUTPATIENT
Start: 2025-01-19 | End: 2025-01-23 | Stop reason: HOSPADM

## 2025-01-19 RX ORDER — ATENOLOL 25 MG/1
25 TABLET ORAL DAILY
Status: DISCONTINUED | OUTPATIENT
Start: 2025-01-19 | End: 2025-01-23 | Stop reason: HOSPADM

## 2025-01-19 RX ORDER — ATENOLOL 50 MG/1
50 TABLET ORAL DAILY
Status: DISCONTINUED | OUTPATIENT
Start: 2025-01-19 | End: 2025-01-19

## 2025-01-19 RX ADMIN — ATENOLOL 25 MG: 25 TABLET ORAL at 18:11

## 2025-01-19 RX ADMIN — ENOXAPARIN SODIUM 40 MG: 100 INJECTION SUBCUTANEOUS at 09:23

## 2025-01-19 RX ADMIN — CLOPIDOGREL BISULFATE 75 MG: 75 TABLET ORAL at 09:23

## 2025-01-19 RX ADMIN — TAMSULOSIN HYDROCHLORIDE 0.4 MG: 0.4 CAPSULE ORAL at 09:23

## 2025-01-19 RX ADMIN — SODIUM CHLORIDE, PRESERVATIVE FREE 10 ML: 5 INJECTION INTRAVENOUS at 09:23

## 2025-01-19 RX ADMIN — LEVOTHYROXINE SODIUM 50 MCG: 0.05 TABLET ORAL at 09:23

## 2025-01-19 RX ADMIN — ACETAMINOPHEN 1000 MG: 500 TABLET ORAL at 01:01

## 2025-01-19 RX ADMIN — Medication 6 MG: at 00:16

## 2025-01-19 RX ADMIN — SENNOSIDES 8.6 MG: 8.6 TABLET, COATED ORAL at 09:23

## 2025-01-19 RX ADMIN — ASPIRIN 81 MG: 81 TABLET, CHEWABLE ORAL at 09:23

## 2025-01-19 RX ADMIN — ATORVASTATIN CALCIUM 40 MG: 40 TABLET, FILM COATED ORAL at 09:23

## 2025-01-19 ASSESSMENT — PAIN DESCRIPTION - LOCATION: LOCATION: LEG

## 2025-01-19 ASSESSMENT — PAIN SCALES - GENERAL: PAINLEVEL_OUTOF10: 3

## 2025-01-19 ASSESSMENT — PAIN DESCRIPTION - DESCRIPTORS: DESCRIPTORS: ACHING

## 2025-01-19 ASSESSMENT — PAIN DESCRIPTION - ORIENTATION: ORIENTATION: RIGHT

## 2025-01-19 NOTE — PLAN OF CARE
Problem: Discharge Planning  Goal: Discharge to home or other facility with appropriate resources  1/19/2025 1002 by Zohreh Carvajal RN  Outcome: Progressing  1/19/2025 0312 by Obey Marin RN  Outcome: Progressing     Problem: Pain  Goal: Verbalizes/displays adequate comfort level or baseline comfort level  1/19/2025 1002 by Zohreh Carvajal RN  Outcome: Progressing  1/19/2025 0312 by Obey Marin RN  Outcome: Progressing     Problem: ABCDS Injury Assessment  Goal: Absence of physical injury  1/19/2025 1002 by Zohreh Carvajal RN  Outcome: Progressing  1/19/2025 0312 by Obey Marin RN  Outcome: Progressing     Problem: Safety - Adult  Goal: Free from fall injury  1/19/2025 1002 by Zohreh Carvajal RN  Outcome: Progressing  1/19/2025 0312 by Obey Marin RN  Outcome: Progressing     Problem: Skin/Tissue Integrity  Goal: Absence of new skin breakdown  Description: 1.  Monitor for areas of redness and/or skin breakdown  2.  Assess vascular access sites hourly  3.  Every 4-6 hours minimum:  Change oxygen saturation probe site  4.  Every 4-6 hours:  If on nasal continuous positive airway pressure, respiratory therapy assess nares and determine need for appliance change or resting period.  1/19/2025 1002 by Zohreh Carvajal RN  Outcome: Progressing  1/19/2025 0312 by Obey Marin RN  Outcome: Progressing     Problem: Neurosensory - Adult  Goal: Achieves stable or improved neurological status  1/19/2025 1002 by Zohreh Carvajal RN  Outcome: Progressing  1/19/2025 0312 by Obey Marin RN  Outcome: Progressing  Goal: Achieves maximal functionality and self care  1/19/2025 1002 by Zohreh Carvajal RN  Outcome: Progressing  1/19/2025 0312 by Obey Marin RN  Outcome: Progressing     Problem: Respiratory - Adult  Goal: Achieves optimal ventilation and oxygenation  1/19/2025 1002 by Zohreh Carvajal RN  Outcome:

## 2025-01-20 ENCOUNTER — APPOINTMENT (OUTPATIENT)
Dept: GENERAL RADIOLOGY | Age: 89
DRG: 065 | End: 2025-01-20
Payer: MEDICARE

## 2025-01-20 LAB
ANION GAP SERPL CALCULATED.3IONS-SCNC: 8 MMOL/L (ref 3–16)
BASOPHILS # BLD: 0.1 K/UL (ref 0–0.2)
BASOPHILS NFR BLD: 1.1 %
BUN SERPL-MCNC: 19 MG/DL (ref 7–20)
CALCIUM SERPL-MCNC: 9.4 MG/DL (ref 8.3–10.6)
CHLORIDE SERPL-SCNC: 103 MMOL/L (ref 99–110)
CO2 SERPL-SCNC: 32 MMOL/L (ref 21–32)
CREAT SERPL-MCNC: 0.7 MG/DL (ref 0.8–1.3)
DEPRECATED RDW RBC AUTO: 20.2 % (ref 12.4–15.4)
EOSINOPHIL # BLD: 0.3 K/UL (ref 0–0.6)
EOSINOPHIL NFR BLD: 3.5 %
GFR SERPLBLD CREATININE-BSD FMLA CKD-EPI: 83 ML/MIN/{1.73_M2}
GLUCOSE SERPL-MCNC: 97 MG/DL (ref 70–99)
HCT VFR BLD AUTO: 46.9 % (ref 40.5–52.5)
HGB BLD-MCNC: 14.7 G/DL (ref 13.5–17.5)
LYMPHOCYTES # BLD: 0.8 K/UL (ref 1–5.1)
LYMPHOCYTES NFR BLD: 8.2 %
MCH RBC QN AUTO: 29.4 PG (ref 26–34)
MCHC RBC AUTO-ENTMCNC: 31.3 G/DL (ref 31–36)
MCV RBC AUTO: 93.7 FL (ref 80–100)
MONOCYTES # BLD: 0.8 K/UL (ref 0–1.3)
MONOCYTES NFR BLD: 8.7 %
NEUTROPHILS # BLD: 7.5 K/UL (ref 1.7–7.7)
NEUTROPHILS NFR BLD: 78.5 %
PLATELET # BLD AUTO: 101 K/UL (ref 135–450)
PMV BLD AUTO: 8.9 FL (ref 5–10.5)
POTASSIUM SERPL-SCNC: 3.6 MMOL/L (ref 3.5–5.1)
RBC # BLD AUTO: 5.01 M/UL (ref 4.2–5.9)
SODIUM SERPL-SCNC: 143 MMOL/L (ref 136–145)
WBC # BLD AUTO: 9.5 K/UL (ref 4–11)

## 2025-01-20 PROCEDURE — 71045 X-RAY EXAM CHEST 1 VIEW: CPT

## 2025-01-20 PROCEDURE — 80048 BASIC METABOLIC PNL TOTAL CA: CPT

## 2025-01-20 PROCEDURE — 99232 SBSQ HOSP IP/OBS MODERATE 35: CPT | Performed by: INTERNAL MEDICINE

## 2025-01-20 PROCEDURE — 36415 COLL VENOUS BLD VENIPUNCTURE: CPT

## 2025-01-20 PROCEDURE — 6370000000 HC RX 637 (ALT 250 FOR IP): Performed by: INTERNAL MEDICINE

## 2025-01-20 PROCEDURE — 6370000000 HC RX 637 (ALT 250 FOR IP): Performed by: NURSE PRACTITIONER

## 2025-01-20 PROCEDURE — 94640 AIRWAY INHALATION TREATMENT: CPT

## 2025-01-20 PROCEDURE — 2700000000 HC OXYGEN THERAPY PER DAY

## 2025-01-20 PROCEDURE — 94761 N-INVAS EAR/PLS OXIMETRY MLT: CPT

## 2025-01-20 PROCEDURE — 1200000000 HC SEMI PRIVATE

## 2025-01-20 PROCEDURE — 2500000003 HC RX 250 WO HCPCS: Performed by: INTERNAL MEDICINE

## 2025-01-20 PROCEDURE — 97530 THERAPEUTIC ACTIVITIES: CPT

## 2025-01-20 PROCEDURE — 92526 ORAL FUNCTION THERAPY: CPT

## 2025-01-20 PROCEDURE — 85025 COMPLETE CBC W/AUTO DIFF WBC: CPT

## 2025-01-20 PROCEDURE — 6360000002 HC RX W HCPCS: Performed by: INTERNAL MEDICINE

## 2025-01-20 RX ORDER — IPRATROPIUM BROMIDE AND ALBUTEROL SULFATE 2.5; .5 MG/3ML; MG/3ML
1 SOLUTION RESPIRATORY (INHALATION) EVERY 4 HOURS PRN
Status: DISCONTINUED | OUTPATIENT
Start: 2025-01-20 | End: 2025-01-23 | Stop reason: HOSPADM

## 2025-01-20 RX ADMIN — TAMSULOSIN HYDROCHLORIDE 0.4 MG: 0.4 CAPSULE ORAL at 10:21

## 2025-01-20 RX ADMIN — ENOXAPARIN SODIUM 40 MG: 100 INJECTION SUBCUTANEOUS at 10:21

## 2025-01-20 RX ADMIN — SENNOSIDES 8.6 MG: 8.6 TABLET, COATED ORAL at 10:21

## 2025-01-20 RX ADMIN — ASPIRIN 81 MG: 81 TABLET, CHEWABLE ORAL at 10:21

## 2025-01-20 RX ADMIN — IPRATROPIUM BROMIDE AND ALBUTEROL SULFATE 1 DOSE: .5; 3 SOLUTION RESPIRATORY (INHALATION) at 22:02

## 2025-01-20 RX ADMIN — SODIUM CHLORIDE, PRESERVATIVE FREE 10 ML: 5 INJECTION INTRAVENOUS at 10:37

## 2025-01-20 RX ADMIN — ATORVASTATIN CALCIUM 40 MG: 40 TABLET, FILM COATED ORAL at 10:21

## 2025-01-20 RX ADMIN — SODIUM CHLORIDE, PRESERVATIVE FREE 10 ML: 5 INJECTION INTRAVENOUS at 21:18

## 2025-01-20 RX ADMIN — CLOPIDOGREL BISULFATE 75 MG: 75 TABLET ORAL at 10:21

## 2025-01-20 RX ADMIN — LEVOTHYROXINE SODIUM 50 MCG: 0.05 TABLET ORAL at 06:33

## 2025-01-20 ASSESSMENT — PAIN SCALES - GENERAL: PAINLEVEL_OUTOF10: 0

## 2025-01-20 NOTE — CARE COORDINATION
Chart review done, rounds completed. Pending medical stability, plan is [facility]. Precert initiated.     Will follow.    Giorgio Boles LMSW, [facility] Social Work Case Management   Phone: [phone]  Fax: [phone]

## 2025-01-20 NOTE — PLAN OF CARE
Problem: Discharge Planning  Goal: Discharge to home or other facility with appropriate resources  1/20/2025 0857 by Rosalinda Hendrickson RN  Outcome: Progressing  1/20/2025 0501 by Obey Marin RN  Outcome: Progressing     Problem: Pain  Goal: Verbalizes/displays adequate comfort level or baseline comfort level  1/20/2025 0857 by Rosalinda Hendrickson RN  Outcome: Progressing  1/20/2025 0501 by Obey Marin RN  Outcome: Progressing     Problem: ABCDS Injury Assessment  Goal: Absence of physical injury  1/20/2025 0857 by Rosalinda Hendrickson RN  Outcome: Progressing  1/20/2025 0501 by Obey Marin RN  Outcome: Progressing     Problem: Safety - Adult  Goal: Free from fall injury  1/20/2025 0857 by Rosalinda Hendrickson RN  Outcome: Progressing  1/20/2025 0501 by Obey Marin RN  Outcome: Progressing     Problem: Skin/Tissue Integrity  Goal: Absence of new skin breakdown  Description: 1.  Monitor for areas of redness and/or skin breakdown  2.  Assess vascular access sites hourly  3.  Every 4-6 hours minimum:  Change oxygen saturation probe site  4.  Every 4-6 hours:  If on nasal continuous positive airway pressure, respiratory therapy assess nares and determine need for appliance change or resting period.  1/20/2025 0857 by Rosalinda Hendricskon RN  Outcome: Progressing  1/20/2025 0501 by Obey Marin RN  Outcome: Progressing     Problem: Neurosensory - Adult  Goal: Achieves stable or improved neurological status  1/20/2025 0857 by Rosalinda Hendrickson RN  Outcome: Progressing  1/20/2025 0501 by Obey Marin RN  Outcome: Progressing  Goal: Achieves maximal functionality and self care  1/20/2025 0857 by Rosalinda Hendrickson RN  Outcome: Progressing  1/20/2025 0501 by Obey Marin RN  Outcome: Progressing     Problem: Respiratory - Adult  Goal: Achieves optimal ventilation and oxygenation  1/20/2025 0857 by Rosalinda Hendrickson RN  Outcome: Progressing  1/20/2025 0501 by

## 2025-01-21 ENCOUNTER — APPOINTMENT (OUTPATIENT)
Dept: CT IMAGING | Age: 89
DRG: 065 | End: 2025-01-21
Attending: INTERNAL MEDICINE
Payer: MEDICARE

## 2025-01-21 PROCEDURE — 6370000000 HC RX 637 (ALT 250 FOR IP): Performed by: NURSE PRACTITIONER

## 2025-01-21 PROCEDURE — 6370000000 HC RX 637 (ALT 250 FOR IP): Performed by: INTERNAL MEDICINE

## 2025-01-21 PROCEDURE — 1200000000 HC SEMI PRIVATE

## 2025-01-21 PROCEDURE — 99233 SBSQ HOSP IP/OBS HIGH 50: CPT | Performed by: INTERNAL MEDICINE

## 2025-01-21 PROCEDURE — 94761 N-INVAS EAR/PLS OXIMETRY MLT: CPT

## 2025-01-21 PROCEDURE — 2500000003 HC RX 250 WO HCPCS: Performed by: INTERNAL MEDICINE

## 2025-01-21 PROCEDURE — 6370000000 HC RX 637 (ALT 250 FOR IP): Performed by: STUDENT IN AN ORGANIZED HEALTH CARE EDUCATION/TRAINING PROGRAM

## 2025-01-21 PROCEDURE — 6360000002 HC RX W HCPCS: Performed by: INTERNAL MEDICINE

## 2025-01-21 PROCEDURE — 70450 CT HEAD/BRAIN W/O DYE: CPT

## 2025-01-21 PROCEDURE — 97530 THERAPEUTIC ACTIVITIES: CPT

## 2025-01-21 PROCEDURE — 92526 ORAL FUNCTION THERAPY: CPT

## 2025-01-21 PROCEDURE — 97110 THERAPEUTIC EXERCISES: CPT

## 2025-01-21 PROCEDURE — 97535 SELF CARE MNGMENT TRAINING: CPT

## 2025-01-21 PROCEDURE — 51798 US URINE CAPACITY MEASURE: CPT

## 2025-01-21 PROCEDURE — 2700000000 HC OXYGEN THERAPY PER DAY

## 2025-01-21 RX ADMIN — TAMSULOSIN HYDROCHLORIDE 0.4 MG: 0.4 CAPSULE ORAL at 09:31

## 2025-01-21 RX ADMIN — HYDROXYZINE HYDROCHLORIDE 10 MG: 10 TABLET ORAL at 22:08

## 2025-01-21 RX ADMIN — SODIUM CHLORIDE, PRESERVATIVE FREE 10 ML: 5 INJECTION INTRAVENOUS at 20:57

## 2025-01-21 RX ADMIN — POLYETHYLENE GLYCOL 3350 17 G: 17 POWDER, FOR SOLUTION ORAL at 10:23

## 2025-01-21 RX ADMIN — LEVOTHYROXINE SODIUM 50 MCG: 0.05 TABLET ORAL at 05:44

## 2025-01-21 RX ADMIN — SODIUM CHLORIDE, PRESERVATIVE FREE 10 ML: 5 INJECTION INTRAVENOUS at 10:09

## 2025-01-21 RX ADMIN — Medication 6 MG: at 22:08

## 2025-01-21 RX ADMIN — CLOPIDOGREL BISULFATE 75 MG: 75 TABLET ORAL at 08:12

## 2025-01-21 RX ADMIN — ATORVASTATIN CALCIUM 40 MG: 40 TABLET, FILM COATED ORAL at 09:31

## 2025-01-21 RX ADMIN — ENOXAPARIN SODIUM 40 MG: 100 INJECTION SUBCUTANEOUS at 08:11

## 2025-01-21 RX ADMIN — ATENOLOL 25 MG: 25 TABLET ORAL at 09:31

## 2025-01-21 RX ADMIN — SENNOSIDES 8.6 MG: 8.6 TABLET, COATED ORAL at 09:31

## 2025-01-21 RX ADMIN — ASPIRIN 81 MG: 81 TABLET, CHEWABLE ORAL at 08:11

## 2025-01-21 NOTE — CARE COORDINATION
SW spoke to bedside nurse and advised that we have precert. We are still waiting on neurology clearance. He is likely not discharging today.     Respectfully submitted,    Yi DUKES, ANDREE  [facility]   [phone]    Electronically signed by ROSELINE Johnson, DELVIN on 1/21/2025 at 9:51 AM

## 2025-01-21 NOTE — FLOWSHEET NOTE
Orders to bladder scan q shift and straight cath if >200. Patient has small amount of output in the purewick. Will continue to monitor. Will refer to night shift.      01/21/25 1554   Urine Assessment   Bladder Scan Volume (mL) 170 mL   $ Bladder scan $ Yes     Electronically signed by Almita Gross RN on 1/21/2025 at 4:07 PM

## 2025-01-21 NOTE — PLAN OF CARE
Problem: Discharge Planning  Goal: Discharge to home or other facility with appropriate resources  1/20/2025 2227 by Dillan Salgado RN  Outcome: Progressing  1/20/2025 0857 by Rosalinda Hendrickson RN  Outcome: Progressing     Problem: Pain  Goal: Verbalizes/displays adequate comfort level or baseline comfort level  1/20/2025 2227 by Dillan Salgado RN  Outcome: Progressing  1/20/2025 0857 by Rosalinda Hendrickson RN  Outcome: Progressing     Problem: ABCDS Injury Assessment  Goal: Absence of physical injury  1/20/2025 2227 by Dillan Salgado RN  Outcome: Progressing  1/20/2025 0857 by Rosalinda Hendrickson RN  Outcome: Progressing     Problem: Safety - Adult  Goal: Free from fall injury  1/20/2025 2227 by Dillan Salgado RN  Outcome: Progressing  1/20/2025 0857 by Rosalinda Hendrickson RN  Outcome: Progressing     Problem: Skin/Tissue Integrity  Goal: Absence of new skin breakdown  Description: 1.  Monitor for areas of redness and/or skin breakdown  2.  Assess vascular access sites hourly  3.  Every 4-6 hours minimum:  Change oxygen saturation probe site  4.  Every 4-6 hours:  If on nasal continuous positive airway pressure, respiratory therapy assess nares and determine need for appliance change or resting period.  1/20/2025 2227 by Dillan Salgado RN  Outcome: Progressing  1/20/2025 0857 by Rosalinda Hendrickson RN  Outcome: Progressing     Problem: Neurosensory - Adult  Goal: Achieves stable or improved neurological status  1/20/2025 2227 by Dillan Salgado RN  Outcome: Progressing  1/20/2025 0857 by Rosalinda Hendrickson RN  Outcome: Progressing  Goal: Achieves maximal functionality and self care  1/20/2025 2227 by Dillan Salgado RN  Outcome: Progressing  1/20/2025 0857 by Rosalinda Hendrickson RN  Outcome: Progressing     Problem: Respiratory - Adult  Goal: Achieves optimal ventilation and oxygenation  1/20/2025 2227 by Dillan Salgado RN  Outcome: Progressing  1/20/2025 0857 by Rosalinda Hendrickson RN  Outcome: Progressing

## 2025-01-21 NOTE — PLAN OF CARE
Problem: Discharge Planning  Goal: Discharge to home or other facility with appropriate resources  1/21/2025 1047 by Gabbie Clark RN  Outcome: Progressing  1/20/2025 2227 by Dillan Salgado RN  Outcome: Progressing     Problem: Pain  Goal: Verbalizes/displays adequate comfort level or baseline comfort level  1/21/2025 1047 by Gabbie Clark RN  Outcome: Progressing  1/20/2025 2227 by Dillan Salgado RN  Outcome: Progressing     Problem: ABCDS Injury Assessment  Goal: Absence of physical injury  1/21/2025 1047 by Gabbie Clark RN  Outcome: Progressing  1/20/2025 2227 by Dillan Salgado RN  Outcome: Progressing     Problem: Safety - Adult  Goal: Free from fall injury  1/21/2025 1047 by Gabbie Clark RN  Outcome: Progressing  1/20/2025 2227 by Dillan Salgado RN  Outcome: Progressing     Problem: Skin/Tissue Integrity  Goal: Absence of new skin breakdown  Description: 1.  Monitor for areas of redness and/or skin breakdown  2.  Assess vascular access sites hourly  3.  Every 4-6 hours minimum:  Change oxygen saturation probe site  4.  Every 4-6 hours:  If on nasal continuous positive airway pressure, respiratory therapy assess nares and determine need for appliance change or resting period.  1/21/2025 1047 by Gabbie Clark RN  Outcome: Progressing  1/20/2025 2227 by Dillan Salgado RN  Outcome: Progressing     Problem: Neurosensory - Adult  Goal: Achieves stable or improved neurological status  1/21/2025 1047 by Gabbie Clark RN  Outcome: Progressing  1/20/2025 2227 by Dillan Salgado RN  Outcome: Progressing  Goal: Achieves maximal functionality and self care  1/21/2025 1047 by Gabbie Calrk RN  Outcome: Progressing  1/20/2025 2227 by Dillan Salgado RN  Outcome: Progressing     Problem: Respiratory - Adult  Goal: Achieves optimal ventilation and oxygenation  1/21/2025 1047 by Gabbie Clark RN  Outcome: Progressing  1/20/2025 2227 by Dillan Salgado RN  Outcome: Progressing     Problem: Cardiovascular

## 2025-01-22 ENCOUNTER — APPOINTMENT (OUTPATIENT)
Dept: GENERAL RADIOLOGY | Age: 89
DRG: 065 | End: 2025-01-22
Payer: MEDICARE

## 2025-01-22 LAB
ACANTHOCYTES BLD QL SMEAR: ABNORMAL
ANION GAP SERPL CALCULATED.3IONS-SCNC: 8 MMOL/L (ref 3–16)
ANISOCYTOSIS BLD QL SMEAR: ABNORMAL
BASOPHILS # BLD: 0 K/UL (ref 0–0.2)
BASOPHILS NFR BLD: 0 %
BUN SERPL-MCNC: 21 MG/DL (ref 7–20)
CALCIUM SERPL-MCNC: 9.6 MG/DL (ref 8.3–10.6)
CHLORIDE SERPL-SCNC: 102 MMOL/L (ref 99–110)
CO2 SERPL-SCNC: 31 MMOL/L (ref 21–32)
CREAT SERPL-MCNC: 0.9 MG/DL (ref 0.8–1.3)
DEPRECATED RDW RBC AUTO: 20.1 % (ref 12.4–15.4)
EOSINOPHIL # BLD: 0.1 K/UL (ref 0–0.6)
EOSINOPHIL NFR BLD: 1 %
GFR SERPLBLD CREATININE-BSD FMLA CKD-EPI: 77 ML/MIN/{1.73_M2}
GLUCOSE SERPL-MCNC: 134 MG/DL (ref 70–99)
HCT VFR BLD AUTO: 47.9 % (ref 40.5–52.5)
HGB BLD-MCNC: 14.6 G/DL (ref 13.5–17.5)
LYMPHOCYTES # BLD: 0.4 K/UL (ref 1–5.1)
LYMPHOCYTES NFR BLD: 3 %
MACROCYTES BLD QL SMEAR: ABNORMAL
MCH RBC QN AUTO: 28.5 PG (ref 26–34)
MCHC RBC AUTO-ENTMCNC: 30.5 G/DL (ref 31–36)
MCV RBC AUTO: 93.5 FL (ref 80–100)
MICROCYTES BLD QL SMEAR: ABNORMAL
MONOCYTES # BLD: 1.7 K/UL (ref 0–1.3)
MONOCYTES NFR BLD: 12 %
NEUTROPHILS # BLD: 12.2 K/UL (ref 1.7–7.7)
NEUTROPHILS NFR BLD: 84 %
OVALOCYTES BLD QL SMEAR: ABNORMAL
PATH INTERP BLD-IMP: YES
PLATELET # BLD AUTO: 70 K/UL (ref 135–450)
PLATELET BLD QL SMEAR: ADEQUATE
PMV BLD AUTO: 8.7 FL (ref 5–10.5)
POIKILOCYTOSIS BLD QL SMEAR: ABNORMAL
POTASSIUM SERPL-SCNC: 4.1 MMOL/L (ref 3.5–5.1)
PROCALCITONIN SERPL IA-MCNC: 0.14 NG/ML (ref 0–0.15)
RBC # BLD AUTO: 5.12 M/UL (ref 4.2–5.9)
SLIDE REVIEW: ABNORMAL
SODIUM SERPL-SCNC: 141 MMOL/L (ref 136–145)
WBC # BLD AUTO: 14.5 K/UL (ref 4–11)

## 2025-01-22 PROCEDURE — 36415 COLL VENOUS BLD VENIPUNCTURE: CPT

## 2025-01-22 PROCEDURE — 97530 THERAPEUTIC ACTIVITIES: CPT

## 2025-01-22 PROCEDURE — 1200000000 HC SEMI PRIVATE

## 2025-01-22 PROCEDURE — 97535 SELF CARE MNGMENT TRAINING: CPT

## 2025-01-22 PROCEDURE — 92526 ORAL FUNCTION THERAPY: CPT

## 2025-01-22 PROCEDURE — 2500000003 HC RX 250 WO HCPCS: Performed by: INTERNAL MEDICINE

## 2025-01-22 PROCEDURE — 84145 PROCALCITONIN (PCT): CPT

## 2025-01-22 PROCEDURE — 6360000002 HC RX W HCPCS: Performed by: INTERNAL MEDICINE

## 2025-01-22 PROCEDURE — 94640 AIRWAY INHALATION TREATMENT: CPT

## 2025-01-22 PROCEDURE — 6370000000 HC RX 637 (ALT 250 FOR IP): Performed by: INTERNAL MEDICINE

## 2025-01-22 PROCEDURE — 80048 BASIC METABOLIC PNL TOTAL CA: CPT

## 2025-01-22 PROCEDURE — 2700000000 HC OXYGEN THERAPY PER DAY

## 2025-01-22 PROCEDURE — 94761 N-INVAS EAR/PLS OXIMETRY MLT: CPT

## 2025-01-22 PROCEDURE — 99233 SBSQ HOSP IP/OBS HIGH 50: CPT | Performed by: INTERNAL MEDICINE

## 2025-01-22 PROCEDURE — 6370000000 HC RX 637 (ALT 250 FOR IP): Performed by: STUDENT IN AN ORGANIZED HEALTH CARE EDUCATION/TRAINING PROGRAM

## 2025-01-22 PROCEDURE — 71045 X-RAY EXAM CHEST 1 VIEW: CPT

## 2025-01-22 PROCEDURE — 51798 US URINE CAPACITY MEASURE: CPT

## 2025-01-22 PROCEDURE — 6370000000 HC RX 637 (ALT 250 FOR IP): Performed by: NURSE PRACTITIONER

## 2025-01-22 PROCEDURE — 97129 THER IVNTJ 1ST 15 MIN: CPT

## 2025-01-22 PROCEDURE — 85025 COMPLETE CBC W/AUTO DIFF WBC: CPT

## 2025-01-22 RX ADMIN — TAMSULOSIN HYDROCHLORIDE 0.4 MG: 0.4 CAPSULE ORAL at 08:58

## 2025-01-22 RX ADMIN — CLOPIDOGREL BISULFATE 75 MG: 75 TABLET ORAL at 08:53

## 2025-01-22 RX ADMIN — ENOXAPARIN SODIUM 40 MG: 100 INJECTION SUBCUTANEOUS at 08:53

## 2025-01-22 RX ADMIN — IPRATROPIUM BROMIDE AND ALBUTEROL SULFATE 1 DOSE: .5; 3 SOLUTION RESPIRATORY (INHALATION) at 03:12

## 2025-01-22 RX ADMIN — SODIUM CHLORIDE, PRESERVATIVE FREE 10 ML: 5 INJECTION INTRAVENOUS at 08:53

## 2025-01-22 RX ADMIN — SENNOSIDES 8.6 MG: 8.6 TABLET, COATED ORAL at 08:52

## 2025-01-22 RX ADMIN — SODIUM CHLORIDE, PRESERVATIVE FREE 10 ML: 5 INJECTION INTRAVENOUS at 20:53

## 2025-01-22 RX ADMIN — ASPIRIN 81 MG: 81 TABLET, CHEWABLE ORAL at 08:52

## 2025-01-22 RX ADMIN — ACETAMINOPHEN 1000 MG: 500 TABLET ORAL at 15:24

## 2025-01-22 RX ADMIN — ATENOLOL 25 MG: 25 TABLET ORAL at 08:52

## 2025-01-22 RX ADMIN — ONDANSETRON 4 MG: 2 INJECTION, SOLUTION INTRAMUSCULAR; INTRAVENOUS at 02:12

## 2025-01-22 RX ADMIN — ATORVASTATIN CALCIUM 40 MG: 40 TABLET, FILM COATED ORAL at 08:53

## 2025-01-22 ASSESSMENT — PAIN SCALES - GENERAL: PAINLEVEL_OUTOF10: 0

## 2025-01-22 NOTE — CARE COORDINATION
Met with patient's daughter Dafne carlisle  She is planning on bring patient back to his home and have family and private caregivers assist with his care.  We discussed hospice care and she is agreeable  Prefers [facility]   Will send referral and have meeting scheduled   Will follow.  Electronically signed by Samina Nunn on 1/22/2025 at 11:24 AM'#[phone]

## 2025-01-22 NOTE — PLAN OF CARE
Problem: Discharge Planning  Goal: Discharge to home or other facility with appropriate resources  Outcome: Progressing     Problem: Pain  Goal: Verbalizes/displays adequate comfort level or baseline comfort level  Outcome: Progressing     Problem: ABCDS Injury Assessment  Goal: Absence of physical injury  Outcome: Progressing     Problem: Safety - Adult  Goal: Free from fall injury  Outcome: Progressing     Problem: Skin/Tissue Integrity  Goal: Absence of new skin breakdown  Description: 1.  Monitor for areas of redness and/or skin breakdown  2.  Assess vascular access sites hourly  3.  Every 4-6 hours minimum:  Change oxygen saturation probe site  4.  Every 4-6 hours:  If on nasal continuous positive airway pressure, respiratory therapy assess nares and determine need for appliance change or resting period.  Outcome: Progressing     Problem: Neurosensory - Adult  Goal: Achieves stable or improved neurological status  Outcome: Progressing     Problem: Neurosensory - Adult  Goal: Achieves maximal functionality and self care  Outcome: Progressing     Problem: Respiratory - Adult  Goal: Achieves optimal ventilation and oxygenation  Outcome: Progressing     Problem: Cardiovascular - Adult  Goal: Maintains optimal cardiac output and hemodynamic stability  Outcome: Progressing     Problem: Cardiovascular - Adult  Goal: Absence of cardiac dysrhythmias or at baseline  Outcome: Progressing     Problem: Skin/Tissue Integrity - Adult  Goal: Skin integrity remains intact  Outcome: Progressing  Flowsheets (Taken 1/22/2025 0934)  Skin Integrity Remains Intact: Monitor for areas of redness and/or skin breakdown     Problem: Skin/Tissue Integrity - Adult  Goal: Incisions, wounds, or drain sites healing without S/S of infection  Outcome: Progressing  Flowsheets (Taken 1/22/2025 0934)  Incisions, Wounds, or Drain Sites Healing Without Sign and Symptoms of Infection: ADMISSION and DAILY: Assess and document risk factors for

## 2025-01-22 NOTE — CONSULTS
Brief Neurology note:     Patient family has since elected to pursue hospice care.   Per primary team re consultation is no longer needed.     Please call back with any further questions or concerns, or should goals of care change.     Cristy Stevens CNP  Neurology.

## 2025-01-22 NOTE — PLAN OF CARE
Problem: Discharge Planning  Goal: Discharge to home or other facility with appropriate resources  Outcome: Progressing     Problem: Pain  Goal: Verbalizes/displays adequate comfort level or baseline comfort level  Outcome: Progressing     Problem: ABCDS Injury Assessment  Goal: Absence of physical injury  Outcome: Progressing     Problem: Safety - Adult  Goal: Free from fall injury  Outcome: Progressing     Problem: Skin/Tissue Integrity  Goal: Absence of new skin breakdown  Description: 1.  Monitor for areas of redness and/or skin breakdown  2.  Assess vascular access sites hourly  3.  Every 4-6 hours minimum:  Change oxygen saturation probe site  4.  Every 4-6 hours:  If on nasal continuous positive airway pressure, respiratory therapy assess nares and determine need for appliance change or resting period.  Outcome: Progressing     Problem: Neurosensory - Adult  Goal: Achieves stable or improved neurological status  Outcome: Progressing  Flowsheets (Taken 1/21/2025 1322 by Almita Gross RN)  Achieves stable or improved neurological status: Assess for and report changes in neurological status  Goal: Achieves maximal functionality and self care  Outcome: Progressing  Flowsheets (Taken 1/21/2025 1322 by Almita Gross RN)  Achieves maximal functionality and self care: Monitor swallowing and airway patency with patient fatigue and changes in neurological status     Problem: Respiratory - Adult  Goal: Achieves optimal ventilation and oxygenation  Outcome: Progressing     Problem: Cardiovascular - Adult  Goal: Maintains optimal cardiac output and hemodynamic stability  Outcome: Progressing  Flowsheets (Taken 1/21/2025 1322 by Almita Gross RN)  Maintains optimal cardiac output and hemodynamic stability: Monitor blood pressure and heart rate  Goal: Absence of cardiac dysrhythmias or at baseline  Outcome: Progressing  Flowsheets (Taken 1/21/2025 1322 by Almita Gross RN)  Absence of cardiac dysrhythmias or

## 2025-01-23 VITALS
BODY MASS INDEX: 30.14 KG/M2 | SYSTOLIC BLOOD PRESSURE: 119 MMHG | WEIGHT: 210.54 LBS | HEIGHT: 70 IN | DIASTOLIC BLOOD PRESSURE: 59 MMHG | HEART RATE: 63 BPM | TEMPERATURE: 98 F | OXYGEN SATURATION: 96 % | RESPIRATION RATE: 25 BRPM

## 2025-01-23 LAB — PATH INTERP BLD-IMP: NORMAL

## 2025-01-23 PROCEDURE — 6370000000 HC RX 637 (ALT 250 FOR IP): Performed by: INTERNAL MEDICINE

## 2025-01-23 PROCEDURE — 94761 N-INVAS EAR/PLS OXIMETRY MLT: CPT

## 2025-01-23 PROCEDURE — 2700000000 HC OXYGEN THERAPY PER DAY

## 2025-01-23 PROCEDURE — 6370000000 HC RX 637 (ALT 250 FOR IP): Performed by: STUDENT IN AN ORGANIZED HEALTH CARE EDUCATION/TRAINING PROGRAM

## 2025-01-23 PROCEDURE — 2500000003 HC RX 250 WO HCPCS: Performed by: INTERNAL MEDICINE

## 2025-01-23 PROCEDURE — 6370000000 HC RX 637 (ALT 250 FOR IP): Performed by: NURSE PRACTITIONER

## 2025-01-23 PROCEDURE — 6360000002 HC RX W HCPCS: Performed by: INTERNAL MEDICINE

## 2025-01-23 PROCEDURE — 99239 HOSP IP/OBS DSCHRG MGMT >30: CPT | Performed by: INTERNAL MEDICINE

## 2025-01-23 RX ORDER — MORPHINE SULFATE 100 MG/5ML
10-20 SOLUTION ORAL
Qty: 30 ML | Refills: 0 | Status: SHIPPED | OUTPATIENT
Start: 2025-01-23 | End: 2025-01-26

## 2025-01-23 RX ORDER — SENNOSIDES A AND B 8.6 MG/1
1 TABLET, FILM COATED ORAL DAILY
COMMUNITY
Start: 2025-01-23 | End: 2025-02-22

## 2025-01-23 RX ORDER — TAMSULOSIN HYDROCHLORIDE 0.4 MG/1
0.4 CAPSULE ORAL DAILY
Qty: 30 CAPSULE | Refills: 3 | Status: SHIPPED | OUTPATIENT
Start: 2025-01-23

## 2025-01-23 RX ORDER — POLYETHYLENE GLYCOL 3350 17 G/17G
17 POWDER, FOR SOLUTION ORAL DAILY PRN
COMMUNITY
Start: 2025-01-23 | End: 2025-02-22

## 2025-01-23 RX ORDER — LORAZEPAM 2 MG/ML
.5-1 CONCENTRATE ORAL EVERY 4 HOURS PRN
Qty: 30 ML | Refills: 1 | Status: SHIPPED | OUTPATIENT
Start: 2025-01-23 | End: 2025-02-06

## 2025-01-23 RX ORDER — CLOPIDOGREL BISULFATE 75 MG/1
75 TABLET ORAL DAILY
Qty: 14 TABLET | Refills: 0 | Status: SHIPPED | OUTPATIENT
Start: 2025-01-23 | End: 2025-02-06

## 2025-01-23 RX ADMIN — HYDROXYZINE HYDROCHLORIDE 10 MG: 10 TABLET ORAL at 11:47

## 2025-01-23 RX ADMIN — LEVOTHYROXINE SODIUM 50 MCG: 0.05 TABLET ORAL at 06:32

## 2025-01-23 RX ADMIN — TAMSULOSIN HYDROCHLORIDE 0.4 MG: 0.4 CAPSULE ORAL at 09:13

## 2025-01-23 RX ADMIN — ASPIRIN 81 MG: 81 TABLET, CHEWABLE ORAL at 09:14

## 2025-01-23 RX ADMIN — SODIUM CHLORIDE, PRESERVATIVE FREE 10 ML: 5 INJECTION INTRAVENOUS at 09:14

## 2025-01-23 RX ADMIN — ATENOLOL 25 MG: 25 TABLET ORAL at 09:13

## 2025-01-23 RX ADMIN — SENNOSIDES 8.6 MG: 8.6 TABLET, COATED ORAL at 09:14

## 2025-01-23 RX ADMIN — CLOPIDOGREL BISULFATE 75 MG: 75 TABLET ORAL at 09:13

## 2025-01-23 RX ADMIN — ATORVASTATIN CALCIUM 40 MG: 40 TABLET, FILM COATED ORAL at 09:14

## 2025-01-23 RX ADMIN — HYDROXYZINE HYDROCHLORIDE 10 MG: 10 TABLET ORAL at 00:41

## 2025-01-23 RX ADMIN — ENOXAPARIN SODIUM 40 MG: 100 INJECTION SUBCUTANEOUS at 09:13

## 2025-01-23 ASSESSMENT — PAIN SCALES - GENERAL: PAINLEVEL_OUTOF10: 0

## 2025-01-23 NOTE — CARE COORDINATION
Case Management Discharge Note          Date / Time of Note: 1/23/2025 12:34 PM                  Patient Name: Milton Victor   YOB: 1925  Diagnosis: Transient alteration of awareness [R40.4]  TIA (transient ischemic attack) [G45.9]  Closed head injury, initial encounter [S09.90XA]  Fall, initial encounter [W19.XXXA]   Date / Time: 1/14/2025  5:13 PM    Financial:  Payor: [facility] MEDICARE / Plan: FERMIN MEDIBLUE ESSENTIAL/PLUS / Product Type: *No Product type* /      Pharmacy:    [facility]/pharmacy #[id number] [place], [place] - [address] - P [phone] - F [phone]  [address]  [facility] [address]  Phone: [phone] Fax: [phone]    [facility]/pharmacy #[id number] [facility] [address] [facility] [phone] - F [phone]  [address] [place] [address]  Phone: [phone] Fax: [phone]      Assistance purchasing medications?: Potential Assistance Purchasing Medications: No  Assistance provided by Case Management: None at this time    DISCHARGE Disposition: Hospice Services    Hospice Services:  Location: Home  Agency:   [address] [place] [address]  Phone: [phone]  Fax: [phone]    Consents signed: Yes    Transportation:  Transportation PLAN for discharge: EMS transportation   Mode of Transport: Ambulance stretcher - BLS  Name of Transport Company: [facility]  Phone: [phone]  Time of Transport: 3:30-4pm    Transport form completed: by hospice TOR MCKEON Completed:   Not Indicated - enrolled in hospice    Additional CM Notes: Spoke to Susan LENTZ with [facility], informs me patient will return home today with hospice services. TOR Carlson aware of dc plan.    The Plan for Transition of Care is related to the following treatment goals of Transient alteration of awareness [R40.4]  TIA (transient ischemic attack) [G45.9]  Closed head injury, initial encounter [S09.90XA]  Fall, initial

## 2025-01-23 NOTE — PLAN OF CARE
Problem: Discharge Planning  Goal: Discharge to home or other facility with appropriate resources  1/23/2025 0204 by Chase Muñoz RN  Outcome: Progressing  Flowsheets (Taken 1/22/2025 2041)  Discharge to home or other facility with appropriate resources: Identify barriers to discharge with patient and caregiver  1/22/2025 1545 by Sera Alexander RN  Outcome: Progressing     Problem: Pain  Goal: Verbalizes/displays adequate comfort level or baseline comfort level  1/23/2025 0204 by Chase Muñoz RN  Outcome: Progressing  1/22/2025 1545 by Sera Alexander RN  Outcome: Progressing     Problem: ABCDS Injury Assessment  Goal: Absence of physical injury  1/23/2025 0204 by Chase Muñoz RN  Outcome: Progressing  1/22/2025 1545 by Sera Alexander RN  Outcome: Progressing     Problem: Safety - Adult  Goal: Free from fall injury  1/23/2025 0204 by Chase Muñoz RN  Outcome: Progressing  1/22/2025 1545 by Sera Alexander RN  Outcome: Progressing     Problem: Skin/Tissue Integrity  Goal: Absence of new skin breakdown  Description: 1.  Monitor for areas of redness and/or skin breakdown  2.  Assess vascular access sites hourly  3.  Every 4-6 hours minimum:  Change oxygen saturation probe site  4.  Every 4-6 hours:  If on nasal continuous positive airway pressure, respiratory therapy assess nares and determine need for appliance change or resting period.  1/23/2025 0204 by Chase Muñoz RN  Outcome: Progressing  1/22/2025 1545 by Sera Alexander RN  Outcome: Progressing     Problem: Neurosensory - Adult  Goal: Achieves stable or improved neurological status  1/23/2025 0204 by Chase Muñoz RN  Outcome: Progressing  Flowsheets (Taken 1/22/2025 2041)  Achieves stable or improved neurological status: Assess for and report changes in neurological status  1/22/2025 1545 by Sera Alexander RN  Outcome: Progressing  Goal: Achieves maximal functionality and self care  1/23/2025 0204 by Chase Muñoz RN  Outcome:

## 2025-01-23 NOTE — PROGRESS NOTES
Neurology Progress Note    Updates  Doing OK.    No acute neurologic issues overnight.   Family and friends at the bedside.      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)     Polycythemia      Past Surgical History:   Procedure Laterality Date    FRACTURE SURGERY      left ankle fracture    MASTOID SURGERY      Childhood    TOTAL KNEE ARTHROPLASTY Left     Partial     Scheduled Meds:   senna  1 tablet Oral Daily    aspirin  81 mg Oral Daily    clopidogrel  75 mg Oral Daily    ondansetron  4 mg Oral Once    sodium chloride flush  5-40 mL IntraVENous 2 times per day    enoxaparin  40 mg SubCUTAneous Daily    atorvastatin  40 mg Oral Daily    levothyroxine  50 mcg Oral QAM AC     Medications Prior to Admission:   potassium chloride (KLOR-CON M) 20 MEQ extended release tablet, Take 1 tablet by mouth daily  fluticasone (FLONASE) 50 MCG/ACT nasal spray, USE 1 SPRAY INTO EACH NOSTRIL TWICE A DAY (Patient taking differently: 1 spray by Nasal route 2 times daily)  levothyroxine (SYNTHROID) 50 MCG tablet, Take 1 tablet by mouth daily  atenolol (TENORMIN) 25 MG tablet, Take 0.5 tablets by mouth daily  hydroCHLOROthiazide (HYDRODIURIL) 25 MG tablet, Take 1 tablet by mouth every morning  atorvastatin (LIPITOR) 40 MG tablet, TAKE 1 TABLET BY MOUTH EVERY DAY  hydroxyurea (HYDREA) 500 MG chemo capsule, TAKE 1 TABLET DAILY ON MON/WED/FRIDAY/SAT/SUN.  nystatin (MYCOSTATIN) 067289 UNIT/GM cream, APPLY TO AFFECTED AREA OF PENIS RASH TWICE DAILY X 4 WEEKS -RESUME WITH FLARES  calcium carbonate (OSCAL) 500 MG TABS tablet, Take 1 tablet by mouth daily  aspirin 81 MG EC tablet, Take 1 tablet by mouth daily  Lift Chair MISC, by Does not apply route    Allergies   Allergen Reactions    Namenda [Memantine Hcl]      Fatigue 
  Neurology Progress Note    Updates  Symptoms have been fluctuating.   Daughter and other family members at the bedside.      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)     Polycythemia      Past Surgical History:   Procedure Laterality Date    FRACTURE SURGERY      left ankle fracture    MASTOID SURGERY      Childhood    TOTAL KNEE ARTHROPLASTY Left     Partial     Scheduled Meds:   senna  1 tablet Oral Daily    aspirin  324 mg Oral Daily    ondansetron  4 mg Oral Once    sodium chloride flush  5-40 mL IntraVENous 2 times per day    enoxaparin  40 mg SubCUTAneous Daily    atenolol  12.5 mg Oral Daily    atorvastatin  40 mg Oral Daily    hydroCHLOROthiazide  25 mg Oral QAM    levothyroxine  50 mcg Oral QAM AC     Medications Prior to Admission:   potassium chloride (KLOR-CON M) 20 MEQ extended release tablet, Take 1 tablet by mouth daily  fluticasone (FLONASE) 50 MCG/ACT nasal spray, USE 1 SPRAY INTO EACH NOSTRIL TWICE A DAY (Patient taking differently: 1 spray by Nasal route 2 times daily)  levothyroxine (SYNTHROID) 50 MCG tablet, Take 1 tablet by mouth daily  atenolol (TENORMIN) 25 MG tablet, Take 0.5 tablets by mouth daily  hydroCHLOROthiazide (HYDRODIURIL) 25 MG tablet, Take 1 tablet by mouth every morning  atorvastatin (LIPITOR) 40 MG tablet, TAKE 1 TABLET BY MOUTH EVERY DAY  hydroxyurea (HYDREA) 500 MG chemo capsule, TAKE 1 TABLET DAILY ON MON/WED/FRIDAY/SAT/SUN.  nystatin (MYCOSTATIN) 587572 UNIT/GM cream, APPLY TO AFFECTED AREA OF PENIS RASH TWICE DAILY X 4 WEEKS -RESUME WITH FLARES  calcium carbonate (OSCAL) 500 MG TABS tablet, Take 1 tablet by mouth daily  aspirin 81 MG EC tablet, Take 1 tablet by mouth daily  Lift Chair MISC, by Does not apply route    Allergies   Allergen Reactions    Namenda 
  Physical Therapy  Daily Treatment Attempt    25    Name: Milton Victor   : 10/22/1925    MRN: [id number]    PT follow-up attempt. Multiple family/friends in room, speaking with neurology. Will follow later as time permits.    Electronically signed by Yariel Price, PT on 2025 at 1:35 PM    
4 Eyes Skin Assessment     NAME:  Milton Victor  YOB: 1925  MEDICAL RECORD NUMBER:  [id number]    The patient is being assessed for  Transfer to New Unit    I agree that at least one RN has performed a thorough Head to Toe Skin Assessment on the patient. ALL assessment sites listed below have been assessed.      Areas assessed by both nurses:    Head, Face, Ears, Shoulders, Back, Chest, Arms, Elbows, Hands, Sacrum. Buttock, Coccyx, Ischium, Legs. Feet and Heels, and Under Medical Devices         Does the Patient have a Wound? No noted wound(s)       Otto Prevention initiated by RN: Yes  Wound Care Orders initiated by RN: No    Pressure Injury (Stage 3,4, Unstageable, DTI, NWPT, and Complex wounds) if present, place Wound referral order by RN under : No    New Ostomies, if present place, Ostomy referral order under : No     Nurse 1 eSignature: Electronically signed by Almita Gross RN on 1/21/25 at 1:43 PM EST    **SHARE this note so that the co-signing nurse can place an eSignature**    Nurse 2 eSignature: {Esignature:[id number]}    
4 Eyes Skin Assessment     NAME:  Milton Victor  YOB: 1925  MEDICAL RECORD NUMBER:  [id number]    The patient is being assessed for  Admission    I agree that at least one RN has performed a thorough Head to Toe Skin Assessment on the patient. ALL assessment sites listed below have been assessed.      Areas assessed by both nurses:    Head, Face, Ears, Shoulders, Back, Chest, Arms, Elbows, Hands, Sacrum. Buttock, Coccyx, Ischium, and Legs. Feet and Heels        Does the Patient have a Wound? No noted wound(s)       Otto Prevention initiated by RN: No  Wound Care Orders initiated by RN: No    Pressure Injury (Stage 3,4, Unstageable, DTI, NWPT, and Complex wounds) if present, place Wound referral order by RN under : No    New Ostomies, if present place, Ostomy referral order under : No     Nurse 1 eSignature: Electronically signed by Bree Jones RN on 1/15/25 at 5:00 AM EST    **SHARE this note so that the co-signing nurse can place an eSignature**    Nurse 2 eSignature: Electronically signed by Keesha Clarke RN on 1/15/25 at 5:25 AM EST    
ARU prior auth initiated through [employer] portal for [employer] with pending auth #[id number]. Will monitor for changes and update CM and MD as needed.   
Accepted for admission to ARU pending medical readiness and insurance approval. Family aware of acceptance and would to discuss and reach back out. MD asked her to communicate through CM. Will initiated pre cert when we hear back from CM. Full consult to follow per Dr. Bal.   
Approval for admission to ARU received this am with start date today and  2/3. CM updated.   
[facility] Note      Chief Complaint: I feel ok    Subjective/Interval History:    This morning the patient is sleeping.  Lengthy conversation with his daughter Dafne at the bedside reviewing the events of the hospitalization.    MRI yesterday did show a small acute infarct.  EEG and echocardiogram okay.  Discussed with the daughter the recommendation for ECF placement by therapy.  They are considering taking him home but knows they will need 24-hour care.    Atenolol and HCTZ were discontinued yesterday afternoon thinking that perhaps low blood pressure is contributing to his stuttering neurologic symptoms.    Patient still requiring oxygen at this point.  Chest x-ray yesterday was clear.  Had some urine retention overnight.    No chest pain or shortness breath. No cough or sputum. No nausea, vomiting, diarrhea. No abdominal pain. No dysuria.  The remainder of the review of systems is negative.     PMH, PSH, FH/SH reviewed and unchanged as documented in the H&P personally documented at admission 1/15/25    Medication list reviewed    Objective:    /70   Pulse 53   Temp 97.5 °F (36.4 °C) (Oral)   Resp 16   Ht 1.778 m (5' 10\")   Wt 96 kg (211 lb 9.9 oz)   SpO2 94%   BMI 30.36 kg/m²   Temp  Av.9 °F (36.6 °C)  Min: 97.5 °F (36.4 °C)  Max: 98.3 °F (36.8 °C)    RRR.  Telemetry remainswith sinus rhythm.  Pulm-respirations are easy.  Scattered rhonchi bilaterally.  Still on 2 L of oxygen.  Abd-  BS+, soft, NTND  Ext- no edema  Neuro-right-sided facial droop is persisting today    The Following Labs Were Reviewed Today:     Recent Results (from the past 24 hour(s))   Echo (TTE) complete (PRN contrast/bubble/strain/3D)    Collection Time: 25  3:25 PM   Result Value Ref Range    LV EDV A2C 106 mL    LV EDV A4C 108 mL    LV EDV 3D 141 mL    LV ESV A2C 41 mL    LV ESV A4C 40 mL    LV ESV 3D 51 mL    EF 3D 64 %    LV Ejection Fraction A2C 62 %    LV Ejection Fraction A4C 63 %    EF BP 62 
CLINICAL PHARMACY NOTE: MEDS TO BEDS    Total # of Prescriptions Filled: 4   The following medications were delivered to the patient:    Tamsulosin 0.4mg  Clopidogrel 75mg  Morphine sulfate 20mg/ml  Lorazepam 2mg./ml      Additional Documentation:  Gave meds to family in room , told for home use only ,  had chilango Baron put  lorazepam  in fridge, pt will be leaving  between 3 to 4pm  
CT shows no evidence of hemorrhagic transformation.  Continue aspirin/Plavix.  Hold on neurology reeval today.  Continue to monitor progress.  Electronically signed by JAROD RAINEY MD on 1/21/2025 at 9:34 AM   
Call received from Ramon Ordaz pertaining CT results. Sushma requested for a direct call from Dr. Hillary Thornton contacted, message left with a call back number for [facility].     Electronically signed by Concepción Funez RN on 1/15/2025 at 6:28 PM         
Case management discussed further with the patient's family.  They would like a hospice consultation today.  Speech concerned about his status and progress.  [facility] consulted.  Electronically signed by JAROD RAINEY MD on 1/22/2025 at 11:19 AM   
Comprehensive Nutrition Assessment    Type and Reason for Visit:  Initial, LOS    Nutrition Recommendations/Plan:   Continue dysphagia mince and moist diet  Patient can order prune juice for constipation as desired  Provide Magic cup (chocolate) BID with lunch and dinner   Continue to monitor patients po intake for any needed changes     Malnutrition Assessment:  Malnutrition Status:  At risk for malnutrition (01/22/25 0844)    Context:  Acute Illness     Findings of the 6 clinical characteristics of malnutrition:  Energy Intake:  Mild decrease in energy intake  Weight Loss:  No weight loss     Body Fat Loss:  No body fat loss     Muscle Mass Loss:  No muscle mass loss    Fluid Accumulation:  Mild Extremities   Strength:  Not Performed    Nutrition Assessment:    Per progress notes patient presented to the ED from a fall and for concerns for a stroke. Patient presented with right-side facial droop and difficulty with speech. Pmh of mild dementia, HTN, and HLD. Patient is currently on a dyshagia mince and moist diet. During assessment patients daughter reports patient had breakfast this morning and ate half of it. Usual diet consist of cracker barrel for breakfast. For lunch eats out at [employer], and for dinner meatloaf and mashed potatoes. Daughter states patient dislikes vegetables. Drinks ice tea, lemonade, and orange juice. Daughter reports patients usual body weight was 213lbs and she has noticed a loss of weight due to eating less than normal. Per weight history patients weight was 213lbs in May of 2024 with 2-3lbs gain/loss fluctuation. Patient is now 209lbs patient lost 4lbs from 05/07/2024-01/22/2025 which is not significant weight loss. Discussed ONS with patients daughter who is receptive to magic cup BID with lunch and dinner. Patients daughter is receptive to prune juice for constipation. Hospice have been discussed with patient and family. Will continue to monitor patients po intake for any needed 
Department of Physical Medicine & Rehabilitation  Progress Note    Patient Identification:  Milton Victor  [id number]  : 10/22/1925  Admit date: 2025    Chief Complaint: TIA (transient ischemic attack)    Subjective:   No acute events overnight. Patient reportedly with worsened RUE weakness.   Patient seen this afternoon sitting up in chair. He is very fatigued. Awakens briefly to voice and quickly falls back asleep.   Caregiver at the bedside reports his RUE does seem weaker. Slurred speech overall stable. She states he has been very restless and unable to get good sleep until this afternoon.   Labs reviewed.     ROS: No f/c, n/v, cp     Objective:  Patient Vitals for the past 24 hrs:   BP Temp Temp src Pulse Resp SpO2 Weight   25 0829 108/60 97.5 °F (36.4 °C) Oral 61 20 92 % --   25 0735 -- -- -- 58 18 92 % --   25 0430 -- -- -- -- -- -- 93.5 kg (206 lb 2.1 oz)   25 0345 (!) 149/73 97.5 °F (36.4 °C) Oral 59 16 94 % --   25 1945 126/62 98.4 °F (36.9 °C) Oral 60 16 94 % --   25 1524 (!) 176/88 97.6 °F (36.4 °C) Oral 66 17 92 % --   25 1224 138/71 97.3 °F (36.3 °C) Oral 64 17 91 % --     Const: Drowsy. No distress  HEENT: Normocephalic, atraumatic. Normal sclera/conjunctiva. MMM.   CV: Regular rate and rhythm.   Resp: No respiratory distress. Lungs diminished.    Abd: Soft, nontender, nondistended, NABS+   Ext: No edema.   Neuro: Drowsy, awakens briefly to voice. Right facial weakness with dysarthria. Right hemipareis (strength testing limited by arousal)  Psych: Cooperative, appropriate mood and affect    Laboratory data: Available via EMR.   Last 24 hour lab  Recent Results (from the past 24 hour(s))   Basic Metabolic Panel w/ Reflex to MG    Collection Time: 25  5:06 AM   Result Value Ref Range    Sodium 143 136 - 145 mmol/L    Potassium reflex Magnesium 3.6 3.5 - 5.1 mmol/L    Chloride 103 99 - 110 mmol/L    CO2 32 21 - 32 mmol/L    Anion Gap 8 3 - 16 
Department of Physical Medicine & Rehabilitation  Progress Note    Patient Identification:  Milton Victor  [id number]  : 10/22/1925  Admit date: 2025    Chief Complaint: TIA (transient ischemic attack)    Subjective:   No acute events overnight.   Repeat CT head negative for acute change.   Patient transferred to PCU this afternoon.   Seen resting in bed. Remains somewhat drowsy (similar to yesterday). He awakens briefly to voice. Makes some attempts to vocalize but speech is unintelligible. Quickly falls back asleep.   Labs reviewed.     ROS: No f/c, n/v, cp     Objective:  Patient Vitals for the past 24 hrs:   BP Temp Temp src Pulse Resp SpO2 Weight   25 1606 (!) 147/72 98.3 °F (36.8 °C) Oral 67 21 96 % --   25 1315 135/66 97.6 °F (36.4 °C) Oral 74 18 97 % --   25 1157 -- -- -- -- 16 96 % --   25 0931 (!) 155/63 -- -- 66 -- -- --   25 0834 (!) 158/60 97.5 °F (36.4 °C) Oral 62 16 96 % --   25 0815 (!) 157/80 97.7 °F (36.5 °C) Oral 61 18 93 % --   25 0530 -- -- -- -- -- -- 94.7 kg (208 lb 12.4 oz)   25 0509 (!) 168/88 97.4 °F (36.3 °C) Oral 58 18 94 % --   25 2339 125/65 97.3 °F (36.3 °C) Oral 62 18 96 % --   25 2205 -- -- -- -- -- 94 % --   25 1955 (!) 166/67 97.5 °F (36.4 °C) Oral 64 20 -- --     Const: Drowsy. No distress  HEENT: Normocephalic, atraumatic. Normal sclera/conjunctiva. MMM.   CV: Regular rate and rhythm.   Resp: No respiratory distress. Lungs diminished.    Abd: Soft, nontender, nondistended, NABS+   Ext: +RUE edema.   Neuro: Drowsy, awakens briefly to voice. Right facial weakness with dysarthria. Right hemipareis (strength testing limited by arousal). Does not seem to have any active movement in RUE.   Psych: Cooperative, appropriate mood and affect    Laboratory data: Available via EMR.   Last 24 hour lab  No results found for this or any previous visit (from the past 24 hour(s)).      Therapy progress:  Physical 
Department of Physical Medicine & Rehabilitation  Progress Note    Patient Identification:  Milton Victor  [id number]  : 10/22/1925  Admit date: 2025    Chief Complaint: TIA (transient ischemic attack)    Subjective:   No acute events overnight.   Patient seen this am resting in bed. Appears comfortable. Awakens briefly and falls back asleep.   Daughter at the bedside. She feels he is very frustrated and upset by his stroke deficits. She reports family now feel taking patient home and maximizing comfort will be best.   Labs reviewed.     ROS: No f/c, n/v, cp     Objective:  Patient Vitals for the past 24 hrs:   BP Temp Temp src Pulse Resp SpO2 Height Weight   25 0853 -- -- -- 75 24 95 % -- --   25 0835 -- -- -- -- -- -- 1.778 m (5' 10\") --   25 0715 (!) 113/51 98.3 °F (36.8 °C) Oral -- -- -- -- --   25 0701 124/63 98.4 °F (36.9 °C) Oral 68 25 92 % -- --   25 0411 -- -- -- -- -- -- -- 94.8 kg (208 lb 15.9 oz)   25 0403 121/64 97.7 °F (36.5 °C) Oral 69 25 91 % -- --   25 0312 -- -- -- 70 (!) 31 94 % -- --   25 1955 (!) 165/81 98.2 °F (36.8 °C) Oral 74 20 96 % -- --   25 1606 (!) 147/72 98.3 °F (36.8 °C) Oral 67 21 96 % -- --   25 1315 135/66 97.6 °F (36.4 °C) Oral 74 18 97 % -- --   25 1157 -- -- -- -- 16 96 % -- --     Const: Drowsy. No distress  HEENT: Normocephalic, atraumatic. Normal sclera/conjunctiva. MMM.   CV: Regular rate and rhythm.   Resp: No respiratory distress. Lungs diminished.    Abd: Soft, nontender, nondistended, NABS+   Ext: +RUE edema.   Neuro: Drowsy, awakens briefly to voice. Right facial weakness with dysarthria. Right hemipareis (strength testing limited by arousal). Does not seem to have any active movement RUE/RLE.   Psych: No agitation.     Laboratory data: Available via EMR.   Last 24 hour lab  No results found for this or any previous visit (from the past 24 hour(s)).      Therapy progress:  Physical 
Discharge instructions reviewed with pt/family, discussed medications, VU, denies any further questions or anxiety related to discharge. Educational handouts in regards to new medications, given via [employer], side effects discussed, VU. IV/tele discontinued. Pt discharged to home with [facility]. Taken from room via stretcher by medical transport, personal belongings taken with.     New medications supplied through outpatient pharmacy.     
During RN handoff, Pt with worsened right arm weakness, difficulty holding right arm extended for any amount of time. Night shift RN reports this is new. MD made aware. Will continue to monitor.     Electronically signed by Rosalinda Hendrickson RN on 1/20/2025 at 12:29 PM      
EEG completed and available for interpretation on the [facility] database .    
[facility] Note      Chief Complaint: I feel ok    Subjective/Interval History:    This morning the patient is resting in the bedside chair.  His daughter Dafne is at the bedside.  Had another episode yesterday afternoon of facial droop and slurred speech with some right-sided weakness apparently.  Code stroke was called and CT of the head and CTA of the head and neck were fairly unremarkable.  MRI is pending for today.  No further episodes since yesterday.    Patient remains on 3 L of oxygen.  Cause is unclear.  Patient with some subtle difficulties during his speech eval yesterday.  Speech is continuing to evaluate on a day-to-day basis.  Patient did well with PT and OT yesterday    No chest pain or shortness breath. No cough or sputum. No nausea, vomiting, diarrhea. No abdominal pain. No dysuria.  The remainder of the review of systems is negative.     PMH, PSH, FH/SH reviewed and unchanged as documented in the H&P personally documented at admission 1/15/25    Medication list reviewed    Objective:    /82   Pulse 66   Temp 97.3 °F (36.3 °C) (Oral)   Resp 18   Ht 1.778 m (5' 10\")   Wt 96 kg (211 lb 10.3 oz)   SpO2 91%   BMI 30.37 kg/m²   Temp  Av °F (36.7 °C)  Min: 97.3 °F (36.3 °C)  Max: 98.2 °F (36.8 °C)    RRR.  Telemetry with sinus rhythm.  Pulm-respirations are easy.  Scattered rhonchi bilaterally.  Still on 3 L of oxygen.  Abd-  BS+, soft, NTND  Ext- no edema  Neuro-right-sided facial droop is a bit more pronounced today.    The Following Labs Were Reviewed Today:     Recent Results (from the past 24 hour(s))   POCT Glucose    Collection Time: 01/15/25  5:03 PM   Result Value Ref Range    POC Glucose 124 (H) 70 - 99 mg/dl    Performed on ACCU-CHEK    CBC with Auto Differential    Collection Time: 01/15/25  5:14 PM   Result Value Ref Range    WBC 12.8 (H) 4.0 - 11.0 K/uL    RBC 5.14 4.20 - 5.90 M/uL    Hemoglobin 15.0 13.5 - 17.5 g/dL    Hematocrit 49.1 40.5 - 52.5 %    MCV 
HOSPICE [facility]    Met with daughters Dafne and Alma Rosa to discuss hospice philosophy and services.  Explained routine vs. continuous care, as well as support team services. Discussed covered vs noncovered equipment, services, and medications and patient/family right to request a review of requested/noncovered items. Time spent listening to events of illness, answering questions, and providing emotional support.Consents reviewed with family and signed by  Dafne Newman.Consent notification sent via [email or web address] to review committee and approved by Keesha Iqbal. Hospice packet given to patient/family and reviewed. Family has requested for family physician to follow. Dr. Yariel José MD contacted and agrees to follow pt. DME ordered and scheduled to be delivered  1/23/25 between . CMT to transport patient at [phone].on 1/23/25. DNRCC and AVS to be transported home with patient. [facility]UC aware to give packet to PTS. Doctor is aware to call comfort meds to [facility] for meds to beds tomorrow  with discharge order. Called SW and made aware of plan.  Called navigators to request Admissions RN for     Pharmacy: [facility] outpatient    
[facility] Note      Chief Complaint: Something is wrong    Subjective/Interval History:    This morning the patient continues to have significant weakness of the right arm and right leg.  His right facial droop is still prominent as well.  Discussed with therapy he has certainly regressed since their evaluation of him on Friday.    Discussed with daughter at the bedside.  Patient had a rough night last night.  He was very restless.  Having some increased chest congestion although chest x-ray did not have any acute disease last night.  Still requiring just 2 L of oxygen.    Patient still did reasonably well with speech eval yesterday.    No other new problems noted today.  Discussed with nursing.    No chest pain or shortness breath.  Increased cough and chest congestion present.  No nausea, vomiting, diarrhea. No abdominal pain. No dysuria.  The remainder of the review of systems is negative.     PMH, PSH, FH/SH reviewed and unchanged as documented in the H&P personally documented at admission 1/15/25    Medication list reviewed    Objective:    BP (!) 168/88   Pulse 58   Temp 97.4 °F (36.3 °C) (Oral)   Resp 18   Ht 1.778 m (5' 10\")   Wt 94.7 kg (208 lb 12.4 oz)   SpO2 94%   BMI 29.96 kg/m²   Temp  Av.5 °F (36.4 °C)  Min: 97.3 °F (36.3 °C)  Max: 97.7 °F (36.5 °C)    RRR.  Telemetry remains with sinus rhythm.  Pulm-respirations are easy.  Lungs clear anteriorly and laterally but unable to sit the patient up today to listen to his posterior lung fields.  Still on 2 L of oxygen.  Abd-  BS+, soft, NTND  Ext- no edema  Neuro-right-sided facial droop is still evident today.  Patient with 2-3/5 strength in his right upper extremity and 2/5 strength in his right leg.  (Admission evaluation had a slight pronator drift in his right arm)    The Following Labs Were Reviewed Today:     Stat CT of the head without contrast pending    No results found for this or any previous visit (from the past 24 
Internal Medicine Hospital Progress Note    Patient:  Milton Victor 89 y.o. male MRN: [id number]     Date of Service: 1/19/2025    Allergy: Namenda [memantine hcl]  CC: F/u:   Brief Course   Milton Victor was admitted on 1/14/2025 for TIA (transient ischemic attack). Milton Victor has been admitted for 5 days    24 hr interval hx:   Some confusion and restlessness overnight.            Objective     Physical Exam:  Vitals: BP (!) 148/62   Pulse 59   Temp 98.4 °F (36.9 °C) (Oral)   Resp 16   Ht 1.778 m (5' 10\")   Wt 97.7 kg (215 lb 6.2 oz)   SpO2 93%   BMI 30.91 kg/m²     General: Alert and oriented X3. No acute distress  Eyes: PEERL. No scleral icterus noted. Normal appearing conjunctiva bilaterally  Ears: Normal TM and external canal bilaterally.  Oral cavity/Throat: No pharyngeal erythema. No oral lesions.  Cardiovascular: Heart is regular rate and rhythm. No murmurs noted. Radial pulses 2+. Posterior tibial pulses 2+. No lower extremity edema noted  Pulmonary: Lungs clear to auscultation bilaterally  Skin: Dry, warm. No obvious skin lesions or rashes noted.  Neuro: PEERL. Lying in bed, up with a steady X1.     Pertinent/ New Labs and Imaging Studies   Labs reviewed. Pertinent labs noted in assessment and plan      Assessment and Plan   Milton Victor was admitted to hospital for TIA (transient ischemic attack)    Weakness and dysarthria  P/w weakness and dysarthria, but symptoms resolved. MRI noting tiny acute lacunar infarct in the left frontal corona radiata  - Neurology following. DAPT for 3 weeks followed by  or clopidogrel  - PMR consuted  - Atenolol and hctz discontinued. Monitor blood pressure    Hypoxia  Began in ED, up to 3L but down to 2L today. Recent CT chest without any acute findings  - Monitor O2 sat, wean oxygen as tolerated.    Urinary retention  - Tamsulosin and PRN straight cath    Hypertension  Some hypotension and concern that could be related to 
Internal Medicine Hospital Progress Note    Patient:  Milton Victor 89 y.o. male MRN: [id number]     Date of Service: 1/18/2025    Allergy: Namenda [memantine hcl]  CC: F/u:   Brief Course   Milton Victor was admitted on 1/14/2025 for TIA (transient ischemic attack). Milton Victor has been admitted for 4 days    24 hr interval hx:   Some confusion and restlessness overnight.   Reports some trouble with speech after breakfast this morning.         Objective     Physical Exam:  Vitals: /68   Pulse 69   Temp 97.6 °F (36.4 °C) (Oral)   Resp 16   Ht 1.778 m (5' 10\")   Wt 96 kg (211 lb 10.3 oz)   SpO2 93%   BMI 30.37 kg/m²     General: Alert and oriented X3. No acute distress  Eyes: PEERL. No scleral icterus noted. Normal appearing conjunctiva bilaterally  Ears: Normal TM and external canal bilaterally.  Oral cavity/Throat: No pharyngeal erythema. No oral lesions.  Cardiovascular: Heart is regular rate and rhythm. No murmurs noted. Radial pulses 2+. Posterior tibial pulses 2+. No lower extremity edema noted  Pulmonary: Lungs clear to auscultation bilaterally  Skin: Dry, warm. No obvious skin lesions or rashes noted.  Neuro: PEERL. Lying in bed, up with a steady X1.     Pertinent/ New Labs and Imaging Studies   Labs reviewed. Pertinent labs noted in assessment and plan      Assessment and Plan   Milton Victor was admitted to hospital for TIA (transient ischemic attack)    Weakness and dysarthria  P/w weakness and dysarthria, but symptoms resolved. MRI noting tiny acute lacunar infarct in the left frontal corona radiata  - Neurology following. DAPT for 3 weeks followed by  or clopidogrel  - PMR consuted  - Atenolol and hctz discontinued. Monitor blood pressure    Hypoxia  Began in ED, up to 3L but down to 2L today. Recent CT chest without any acute findings  - Monitor O2 sat, wean oxygen as tolerated.    Urinary retention  - Tamsulosin and PRN straight 
[facility]      DNRCC and AVS to be transported home with patient. HUC aware to give packet to PTS. Called pharmacy and provided BIN number, PCN, Group, and patient identifier. [facility] will provide meds to beds.Called SW and made aware of plan.Hospital RN to medicate patient prior to transport.   Called navigators to request Admissions RN for tomorrow [phone]. CMT to transport patient at [phone]. Family updated to plan . Equipment being delivered at 1400 per [employer]. Please call with any concerns.    Susan Zheng RN  [phone]                                    
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   has a past surgical history that includes Mastoid surgery; fracture surgery; and Total knee arthroplasty (Left).  Allergies: medication allergies noted  Dysphagia History including instrumental assessment: none on record; denies prior  GI history: none on record; denies  Baseline/Prior Level of Function: Living Status: admitted form home with family nearby; Baseline diet: regular/thin    Onset: 2025     Treatment Diagnosis: Oropharyngeal dysphagia    CURRENT ENCOUNTER DIAGNOSTICS/COURSE OF ADMISSION     2025 admitted with c/o fall. MD ADMISSION H&P HPI: \"This is a 89-year-old white male with a history of mild dementia, hypertension, hyperlipidemia who presented to the emergency room yesterday after a fall and approximately 30 minutes of right-sided facial droop and difficulty with speech.  The family is not here currently to give more history and the patient does not remember the event very well other than falling. Currently sitting up in bedside chair and is at his mental status baseline.  He has no complaints and is resting comfortably.  No new problems noted overnight.  Nursing at the bedside.  Bedside swallow eval to be completed shortly. Patient denies chest pain or shortness of breath denies cough or sputum.  He is currently on 1 L of oxygen and has been since he was in the emergency room last night.  Nursing will work to wean this today.\"  Consults noted: 
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   has a past surgical history that includes Mastoid surgery; fracture surgery; and Total knee arthroplasty (Left).  Allergies: medication allergies noted  Dysphagia History including instrumental assessment: none on record; denies prior  GI history: none on record; denies  Baseline/Prior Level of Function: Living Status: admitted form home with family nearby; Baseline diet: regular/thin    Onset: 2025     Treatment Diagnosis: Oropharyngeal dysphagia    CURRENT ENCOUNTER DIAGNOSTICS/COURSE OF ADMISSION     2025 admitted with c/o fall. MD ADMISSION H&P HPI: \"This is a 89-year-old white male with a history of mild dementia, hypertension, hyperlipidemia who presented to the emergency room yesterday after a fall and approximately 30 minutes of right-sided facial droop and difficulty with speech.  The family is not here currently to give more history and the patient does not remember the event very well other than falling. Currently sitting up in bedside chair and is at his mental status baseline.  He has no complaints and is resting comfortably.  No new problems noted overnight.  Nursing at the bedside.  Bedside swallow eval to be completed shortly. Patient denies chest pain or shortness of breath denies cough or sputum.  He is currently on 1 L of oxygen and has been since he was in the emergency room last night.  Nursing will work to wean this today.\"  Consults noted: 
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   has a past surgical history that includes Mastoid surgery; fracture surgery; and Total knee arthroplasty (Left).  Allergies: medication allergies noted  Dysphagia History including instrumental assessment: none on record; denies prior  GI history: none on record; denies  Baseline/Prior Level of Function: Living Status: admitted form home with family nearby; Baseline diet: regular/thin    Onset: 2025     Treatment Diagnosis: Oropharyngeal dysphagia    CURRENT ENCOUNTER DIAGNOSTICS/COURSE OF ADMISSION     2025 admitted with c/o fall. MD ADMISSION H&P HPI: \"This is a 89-year-old white male with a history of mild dementia, hypertension, hyperlipidemia who presented to the emergency room yesterday after a fall and approximately 30 minutes of right-sided facial droop and difficulty with speech.  The family is not here currently to give more history and the patient does not remember the event very well other than falling. Currently sitting up in bedside chair and is at his mental status baseline.  He has no complaints and is resting comfortably.  No new problems noted overnight.  Nursing at the bedside.  Bedside swallow eval to be completed shortly. Patient denies chest pain or shortness of breath denies cough or sputum.  He is currently on 1 L of oxygen and has been since he was in the emergency room last night.  Nursing will work to wean this today.\"  Consults noted: 
Medication Reconciliation     List of medications patient is currently taking is complete.     Source of information: 1. Conversation with patient at bedside                                      2. EPIC records      Allergies  Namenda [memantine hcl]     Notes regarding home medications:  1. Patient stated he took the morning doses of his home medications today prior to arrival in the ED  2. Was previously taking ASA 81 qAM and  qPM. Stopping taking the 325 mg tabs yesterday per his cardiologist    Veronica Murphy, Pharmacy Intern  1/14/2025 9:00 PM     
Nursing bedside Swallow screen completed  Passed Screen: Patient started on ordered diet    Electronically signed by Concepción Funez RN on 1/15/2025 at 9:44 AM     
Occupational Therapy  Facility/Department: [address] PROGRESSIVE CARE  Occupational Therapy Treatment and Tentative D/C      Name: Milton Victor  : 10/22/1925  MRN: [id number]  Date of Service: 2025    Staff assist recommendation: Maxi move      Discharge Recommendations: Milton Victor scored a 10/24 on the AM-PAC ADL Inpatient form. Current research shows that an AM-PAC score of 18 or greater is typically associated with a discharge to the patient's home setting.     If patient discharges prior to next session this note will serve as a discharge summary.  Please see below for the latest assessment towards goals.     Continue to assess pending progress  OT Equipment Recommendations  Equipment Needed:  (lift, hospital bed if to return home)       Patient Diagnosis(es): The primary encounter diagnosis was Fall, initial encounter. Diagnoses of Closed head injury, initial encounter, Transient alteration of awareness, TIA (transient ischemic attack), Hypoxia, and Goals of care, counseling/discussion were also pertinent to this visit.  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  Past Surgical History:  has a past surgical history that includes Mastoid surgery; fracture surgery; and Total knee arthroplasty (Left).    Treatment Diagnosis: Decreased: ADLs, functional transfers/mobility      Assessment  Performance deficits / Impairments: Decreased functional mobility ;Decreased ADL status;Decreased endurance;Decreased safe awareness;Decreased high-level IADLs;Decreased strength;Decreased balance;Decreased fine motor control;Decreased coordination  Assessment: Pt continues to be limited by ongoing weakness/fatigue and lethargy. Pt completes bed mobility with Max/Total Ax2. Pt sits EOB with significant assist. Pt 
Occupational Therapy  Facility/Department: [address] MED SURG  Occupational Therapy Daily Treatment    Name: Milton Victor  : 10/22/1925  MRN: [id number]  Date of Service: 2025    Discharge Recommendations:  5-7 sessions per week, Patient would benefit from continued therapy after discharge      Milton Victor scored a 10/24 on the AM-PAC ADL Inpatient form. Current research shows that an AM-PAC score of 17 or less is typically not associated with a discharge to the patient's home setting. Based on the patient's AM-PAC score and their current ADL deficits, it is recommended that the patient have 5-7 sessions per week of Occupational Therapy at d/c to increase the patient's independence.  At this time, this patient demonstrates complex nursing, medical, and rehabilitative needs, and would benefit from intensive rehabilitation services upon discharge from the Inpatient setting.  This patient demonstrates the ability to participate in and benefit from an intensive therapy program with a coordinated interdisciplinary team approach to foster frequent, structured, and documented communication among disciplines, who will work together to establish, prioritize, and achieve treatment goals. Please see assessment section for further patient specific details.    If patient discharges prior to next session this note will serve as a discharge summary.  Please see below for the latest assessment towards goals.     Patient Diagnosis(es): The primary encounter diagnosis was Fall, initial encounter. Diagnoses of Closed head injury, initial encounter, Transient alteration of awareness, TIA (transient ischemic attack), Hypoxia, and Goals of care, counseling/discussion were also pertinent to this visit.  Past Medical History:  has a past medical history of Amaurosis fugax of right eye, Aortic valve disorders, Essential hypertension, Hearing loss, Hyperlipidemia, Hypertension, Kidney stone, Mitral valve 
Occupational Therapy  Facility/Department: [address] MED SURG  Occupational Therapy Daily Treatment    Name: Milton Victor  : 10/22/1925  MRN: [id number]  Date of Service: 2025    Discharge Recommendations:  5-7 sessions per week, Patient would benefit from continued therapy after discharge     Milton Victor scored a 15/24 on the AM-PAC ADL Inpatient form. Current research shows that an AM-PAC score of 17 or less is typically not associated with a discharge to the patient's home setting. Based on the patient's AM-PAC score and their current ADL deficits, it is recommended that the patient have 5-7 sessions per week of Occupational Therapy at d/c to increase the patient's independence.  At this time, this patient demonstrates complex nursing, medical, and rehabilitative needs, and would benefit from intensive rehabilitation services upon discharge from the Inpatient setting.  This patient demonstrates the ability to participate in and benefit from an intensive therapy program with a coordinated interdisciplinary team approach to foster frequent, structured, and documented communication among disciplines, who will work together to establish, prioritize, and achieve treatment goals. Please see assessment section for further patient specific details.    If patient discharges prior to next session this note will serve as a discharge summary.  Please see below for the latest assessment towards goals.      Patient Diagnosis(es): The primary encounter diagnosis was Fall, initial encounter. Diagnoses of Closed head injury, initial encounter, Transient alteration of awareness, TIA (transient ischemic attack), Hypoxia, and Goals of care, counseling/discussion were also pertinent to this visit.  Past Medical History:  has a past medical history of Amaurosis fugax of right eye, Aortic valve disorders, Essential hypertension, Hearing loss, Hyperlipidemia, Hypertension, Kidney stone, Mitral valve 
Occupational Therapy  Plan for pt to return home with hospice this date 1/23. Anticipate need for lift. Will sign off at this time.     Raul Verduzco OTR/L  
Occupational Therapy Attempt  Milton Victor    OT attempt. Noted neurology note recommends bedrest until tomorrow morning d/t BP issues. Will re-attempt when appropriate.    Electronically signed by Alma Rosa Rosario OT on 1/16/25 at 1:53 PM EST    
Patient arrived to room 5260 from 4N. Patient is alert and oriented and on 2L NC. Family at bedside. Placed the patient on purewick and on the bedside monitor. Bedside NIH completed with RN, Score of 10. Unchanged from last NIH assessment . Patient denies needs at this time.     Electronically signed by Almita Gross RN on 1/21/2025 at 1:19 PM    
Patient back from MRI    No needs voiced    Electronically signed by Concepción Funez RN on 1/15/2025 at 11:03 AM   
Patient has been coughing with thin liquids. This RN offered thickened water and noted patient tolerating it much better. Discussed with patient's daughter Alma Rosa Thornton at bedside.   
Patient has gotten restless and says he is 'hot'. Temp is 99.1. Gave prn tylenol. Oxygen requirement remains at 4L. Dr Pineda updated. Will continue to monitor.   
Patient has no urine output since last night. Bladder scan showed 500ml. Patient is on bedrest and He wants to go to the toilet to urinate. Informed Raj Knapp MD, he ordered to do straight cath. Patient refused straight, He tried to urinate in the urinal. He passed around 200ml of urine. Patient's still trying to pee.  
Patient off of unit for MRI  
Physical Therapy  Facility/Department: [address] MED SURG  Physical Therapy Treatment Note    Name: Milton Victor  : 10/22/1925  MRN: [id number]  Date of Service: 2025    Discharge Recommendations:  Therapy recommended at discharge, Patient able to tolerate 3 hours of therapy per day, Continue to assess pending progress     Milton Victor scored a 10/24 on the AM-PAC short mobility form. Current research shows that an AM-PAC score of 17 or less is typically not associated with a discharge to the patient's home setting. Based on the patient's AM-PAC score and their current functional mobility deficits, it is recommended that the patient have 5-7 sessions per week of Physical Therapy at d/c to increase the patient's independence.  At this time, this patient demonstrates complex nursing, medical, and rehabilitative needs, and would benefit from intensive rehabilitation services upon discharge from the Inpatient setting.  This patient demonstrates the ability to participate in and benefit from an intensive therapy program with a coordinated interdisciplinary team approach to foster frequent, structured, and documented communication among disciplines, who will work together to establish, prioritize, and achieve treatment goals. Please see assessment section for further patient specific details.    If patient discharges prior to next session this note will serve as a discharge summary.  Please see below for the latest assessment towards goals.           Patient Diagnosis(es): The primary encounter diagnosis was Fall, initial encounter. Diagnoses of Closed head injury, initial encounter, Transient alteration of awareness, TIA (transient ischemic attack), Hypoxia, and Goals of care, counseling/discussion were also pertinent to this visit.  Past Medical History:  has a past medical history of Amaurosis fugax of right eye, Aortic valve disorders, Essential hypertension, Hearing loss, Hyperlipidemia, 
Physical Therapy  Facility/Department: [address] MED SURG  Physical Therapy Treatment Note    Name: Milton Victor  : 10/22/1925  MRN: [id number]  Date of Service: 2025    Discharge Recommendations:  Therapy recommended at discharge, Continue to assess pending progress        Milton Victor scored a  on the AM-PAC short mobility form. Current research shows that an AM-PAC score of 17 or less is typically not associated with a discharge to the patient's home setting. Based on the patient's AM-PAC score and their current functional mobility deficits, it is recommended that the patient have 3-5 sessions per week of Physical Therapy at d/c to increase the patient's independence.  Please see assessment section for further patient specific details.    If patient discharges prior to next session this note will serve as a discharge summary.  Please see below for the latest assessment towards goals.      Patient Diagnosis(es): The primary encounter diagnosis was Fall, initial encounter. Diagnoses of Closed head injury, initial encounter, Transient alteration of awareness, TIA (transient ischemic attack), Hypoxia, and Goals of care, counseling/discussion were also pertinent to this visit.  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  Past Surgical History:  has a past surgical history that includes Mastoid surgery; fracture surgery; and Total knee arthroplasty (Left).    Assessment  Body Structures, Functions, Activity Limitations Requiring Skilled Therapeutic Intervention: Decreased functional mobility ;Decreased ADL status;Decreased strength;Decreased safe awareness;Decreased endurance;Decreased balance  Assessment: Pt is a 89 y.o. male who presented to the ED on 25 following fall, hitting head, 
Physical Therapy  Facility/Department: [address] PROGRESSIVE CARE  Physical Therapy Daily Treatment Session    Name: Milton Victor  : 10/22/1925  MRN: [id number]  Date of Service: 2025    Discharge Recommendations:   Continue to assess pending progress   PT Equipment Recommendations  Equipment Needed: Yes (If pt returns home)  Mobility Devices: Hospital Bed;Transport Devices  Transport Devices: Patient Mechanical Lift      Patient Diagnosis(es): The primary encounter diagnosis was Fall, initial encounter. Diagnoses of Closed head injury, initial encounter, Transient alteration of awareness, TIA (transient ischemic attack), Hypoxia, and Goals of care, counseling/discussion were also pertinent to this visit.  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  Past Surgical History:  has a past surgical history that includes Mastoid surgery; fracture surgery; and Total knee arthroplasty (Left).    Assessment  Body Structures, Functions, Activity Limitations Requiring Skilled Therapeutic Intervention: Decreased functional mobility ;Decreased strength;Decreased endurance;Decreased balance;Decreased cognition  Assessment: Pt is a 89 y.o. male who presented to the ED on 25 following fall, hitting head, stroke-like symptoms. Admitting diagnosis is TIA. MRI 25 shows tiny acute lacunar infarct involving the left frontal corona radiata. Prior to admission, pt living alone in home setting, independent with ADLs and ambulatory with RW. Pt now requiring max-dependent A x 2 for performance of all bed mobility tasks and was unable to transfer or ambulate. Pts family is considering hospice at this time. Will continue to follow and update D/C plan as needed.  Treatment Diagnosis: Impaired functional mobility  Barriers to 
Physical Therapy  Facility/Department: [address] MED SURG  Physical Therapy Treatment Note    Name: Milton Victor  : 10/22/1925  MRN: [id number]  Date of Service: 2025    Discharge Recommendations:  Therapy recommended at discharge, Patient able to tolerate 3 hours of therapy per day, Continue to assess pending progress   .  Milton Victor scored a 12/ on the AM-PAC short mobility form. Current research shows that an AM-PAC score of 17 or less is typically not associated with a discharge to the patient's home setting. Based on the patient's AM-PAC score and their current functional mobility deficits, it is recommended that the patient have 5-7 sessions per week of Physical Therapy at d/c to increase the patient's independence.  At this time, this patient demonstrates complex nursing, medical, and rehabilitative needs, and would benefit from intensive rehabilitation services upon discharge from the Inpatient setting.  This patient demonstrates the ability to participate in and benefit from an intensive therapy program with a coordinated interdisciplinary team approach to foster frequent, structured, and documented communication among disciplines, who will work together to establish, prioritize, and achieve treatment goals. Please see assessment section for further patient specific details.    If patient discharges prior to next session this note will serve as a discharge summary.  Please see below for the latest assessment towards goals.           Patient Diagnosis(es): The primary encounter diagnosis was Fall, initial encounter. Diagnoses of Closed head injury, initial encounter, Transient alteration of awareness, TIA (transient ischemic attack), Hypoxia, and Goals of care, counseling/discussion were also pertinent to this visit.  Past Medical History:  has a past medical history of Amaurosis fugax of right eye, Aortic valve disorders, Essential hypertension, Hearing loss, Hyperlipidemia, 
Prior auth remains pending per [facility] portal for [place] for admission to ARU. Will continue to monitor for changes and update CM and MD as needed.   
Pt arrived to room 4268 via stretcher. Pt ambulated to bed and bathroom with CGA/gait belt. Pt A/O x4. Pt oriented to room, oriented to call light, bedside table within reach. This RN performed NIHSS and swallow test. Pt daughter Alma Rosa Thornton at bedside, she took all pt jewelry home. Pt very hard of hearing, BL hearing aids and glasses left on bedside table. Hearing Aides labeled in denture cup. All needs met at this time.  
Pt asked to ambulate to bathroom with David. Pt attempted, but Half way to the bathroom pt began to show weakness in his right lower extremity and seemed to drag it Took 3 RN's to advance pt completely into bathroom. Pt was very distraught that he was unable to make it by himself. Used Stedy x1 to get pt back to bed. Pt was able to pull himself up and stand.Performed NIH once pt was returned to bed and pt able to lift legs and arms with no drift present.   
Pt called for assistance stating that he needed to urinate.  Stedy used to assist patient to toilet, bladder scan performed PVR of 0ml.  
Pt had urge to void this afternoon. PCA took pt to bathroom and able to void. Family at bedside. This RN bladder scanned pt post void. Only retaining 140 ml at this time. Will continue to monitor pt.   
Pt restless. Pt repeatedly wakes up yelling but does not know why or what he wants. Family member is in the room.  
Pt's family at bedside. Daughter Dafne at bedside. Pt is wearing 2 L of oxygen, non-dependent. Dr. Acevedo called this RN and asked this RN to encourage pt to use IS and to help wean him off of oxygen. Pt and family educated on use of IS. Pt returned demonstration, able to achieve 500-1000 ml on IS. Pt still has intermittent confusion and repeats questions, \"Why am I here? How long have I been here?\" Pt redirected and reoriented, which makes pt emotional at times. Tearful, but pleasant. Family supportive at bedside. This RN educated family on importance of pt getting rest. Pt's daughter Dafne verbalized understanding of this by reminding other family members that pt needs rest.  
Pt's private caregiver Cherie is at bedside. Per caregiver, pt able to void in urinal with assistance.   
Report given to Almita LENTZ.   
Report given to TOR Rodas.  NIHSS completed during bedside report.  
SLP NOTE    Follow-up/tx attempted. Patient unavailable out of room at diagnostics. ST to re-attempt as schedule permits unless otherwise notified.    Thank you.  Gabrielle Vicente MA, CCC-SLP, #[id number]  Speech-Language Pathologist  Portable phone: [phone]   
SLP NOTE    Patient with planned discharge home with hospice this date. ST to discontinue at this time.     Thank you.  Gabrielle Vicente MA, CCC-SLP, #[id number]  Speech-Language Pathologist  Portable phone: [phone]   
[place] [facility] Note      Chief Complaint: I want to go home    Subjective/Interval History:    This morning the patient is resting comfortably.  He remains on 4 L of oxygen.    Patient's family decided yesterday that home hospice is the route they would like to pursue.  Hospice has met with the patient and the plan is for discharge to home later today.    No new problems or concerns this morning.    No chest pain or shortness breath.  Increased cough and chest congestion present.  No nausea, vomiting, diarrhea. No abdominal pain. No dysuria.  The remainder of the review of systems is negative.     PMH, PSH, FH/SH reviewed and unchanged as documented in the H&P personally documented at admission 1/15/25    Medication list reviewed    Objective:    BP (!) 119/59   Pulse 63   Temp 98 °F (36.7 °C) (Axillary)   Resp 25   Ht 1.778 m (5' 10\")   Wt 95.5 kg (210 lb 8.6 oz)   SpO2 96%   BMI 30.21 kg/m²   Temp  Av.2 °F (36.8 °C)  Min: 97.5 °F (36.4 °C)  Max: 99.1 °F (37.3 °C)    RRR.  Telemetry remains with sinus rhythm.  Pulm-respirations are easy.  Scattered posterior rhonchi are noted.  Abd-  BS+, soft, NTND  Ext- no edema  Neuro-continued right-sided weakness today    The Following Labs Were Reviewed Today:     Recent Results (from the past 24 hour(s))   Procalcitonin    Collection Time: 25 11:19 AM   Result Value Ref Range    Procalcitonin 0.14 0.00 - 0.15 ng/mL   CBC with Auto Differential    Collection Time: 25 11:19 AM   Result Value Ref Range    WBC 14.5 (H) 4.0 - 11.0 K/uL    RBC 5.12 4.20 - 5.90 M/uL    Hemoglobin 14.6 13.5 - 17.5 g/dL    Hematocrit 47.9 40.5 - 52.5 %    MCV 93.5 80.0 - 100.0 fL    MCH 28.5 26.0 - 34.0 pg    MCHC 30.5 (L) 31.0 - 36.0 g/dL    RDW 20.1 (H) 12.4 - 15.4 %    Platelets 70 (L) 135 - 450 K/uL    MPV 8.7 5.0 - 10.5 fL    PLATELET SLIDE REVIEW Adequate     SLIDE REVIEW see below     Path Consult Yes     Neutrophils % 84.0 %    Lymphocytes % 3.0 %    
Spiritual Health History and Assessment/Progress Note  [facility]    Spiritual/Emotional Needs,  , Adjustment to illness,      Name: Milton Victor MRN: [id number]    Age: 89 y.o.     Sex: male   Language: English   Voodoo: Restoration   TIA (transient ischemic attack)     Date: 1/15/2025            Total Time Calculated: 30 min              Spiritual Assessment began in [facility]        Referral/Consult From: Nurse   Encounter Overview/Reason: Spiritual/Emotional Needs  Service Provided For: Patient and family together    Amy, Belief, Meaning:   Patient is connected with a amy tradition or spiritual practice and has beliefs or practices that help with coping during difficult times  Family/Friends are connected with a amy tradition or spiritual practice and have beliefs or practices that help with coping during difficult times      Importance and Influence:  Patient has no beliefs influential to healthcare decision-making identified during this visit  Family/Friends have no beliefs influential to healthcare decision-making identified during this visit    Community:  Patient feels well-supported. Support system includes: Children, Amy Community, and Extended family  Family/Friends feel well-supported. Support system includes: Parent/s, Children, Amy Community, and Extended family    Assessment and Plan of Care:     Patient Interventions include: Facilitated expression of thoughts and feelings, Explored spiritual coping/struggle/distress, Engaged in theological reflection, Affirmed coping skills/support systems, and Facilitated life review and/ or legacy  Family/Friends Interventions include: Facilitated expression of thoughts and feelings, Explored spiritual coping/struggle/distress, and Affirmed coping skills/support systems    Patient Plan of Care: Spiritual Care available upon further referral  Family/Friends Plan of Care: Spiritual Care available upon further 
chest x-ray today.  May need a little Lasix if volume overloaded.    Urine retention-straight cath as needed.  Tolerating Flomax.     Essential hypertension-meds stopped.  Needing to avoid low blood pressure as his neuro symptoms may have worsened in that time.  Continue to monitor     Mixed hyperlipidemia-stable current statin therapy    Thyroid nodule-consider outpatient follow-up of his thyroid nodule    Disposition-patient has been approved for the acute rehab unit.  Dr. Bal was questioning yesterday whether he was still a candidate given his increased weakness.  Await Dr. Bal's repeat evaluation today.  Lengthy conversation with the patient's daughter and son-in-law today.  If he is not in acute rehab candidate they would like to still take him home with 24-hour care.  If he has continued decline at home then we will need to consider hospice care.  That being said, family also considering whether or not they can manage him at home given his right side weakness and relative immobility.    Code Status-DNR CC.    Prognosis-prognosis guarded.  Worry about progression of symptoms and overall worsening status.    JAROD RAINEY MD, FACP  8:10 AM  1/22/2025    
denies cough or sputum.  He is currently on 1 L of oxygen and has been since he was in the emergency room last night.  Nursing will work to wean this today.\"  Consults noted:  none posted at this time    Most Recent Imagin2025 XR Ribs: IMPRESSION: 1. No acute cardiopulmonary process. 2. Old healed bilateral rib fractures. No new right rib fractures are seen.  2025 CT Cervical Spine: IMPRESSION: Linear lucency traversing the left transverse process of C5 that appears to extend posteriorly into the soft tissues on the sagittal and thin reconstructions.  This is most likely artifact as opposed to fracture. Thyroid nodules measuring up to 1.6 cm.  Recommend nonemergent thyroid ultrasound.  2025 CT Head: IMPRESSION: 1. No acute intracranial abnormality. 2. Stable 2.5 cm extra-axial mass along the medial right tentorial leaflet, suggestive of a meningioma.  2025 CTA Head/Neck: IMPRESSION: No acute arterial abnormality or hemodynamically significant arterial stenosis in the head or neck.  1/15/2025 MRI Brain: IMPRESSION: 1. No acute infarct or other acute intracranial process. 2. 2.5 cm meningioma insinuating along the medial right tentorial leaflet. No  significant mass effect or midline shift. 3. Right parietal subgaleal hematoma.    Current Diet Level (prior to evaluation): Regular texture, Thin liquids      Respiratory Status: O2 via nasal cannula, 1L    SUBJECTIVE     General: Accepted and tolerated evaluation at bedside.    Comments regarding referral/diet/dysphagia:   Patient: denies dysphagia  Family: reports improving R facial weakness and improved speech; \"mild dementia\" at baseline  Staff: no dysphagia concerns per RN    ASSESSMENT / IMPRESSIONS     Dysphagia Impressions/Diagnosis: Oropharyngeal Dysphagia   Patient alert and cooperative; verbally responsive; follows simple dx; [place] with discharged hearing aids, dependent on written communication this date.  Pre-feeding assessment: patient 
training, Transfer training, Neuromuscular re-education, Gait training, Endurance training, Patient/Caregiver education & training, Equipment evaluation, education, & procurement, Safety education & training, Therapeutic activities  Safety Devices  Type of Devices: All fall risk precautions in place, Call light within reach, Gait belt, Patient at risk for falls, Left in chair, Chair alarm in place, Nurse notified    Restrictions  Restrictions/Precautions  Restrictions/Precautions: Fall Risk     Subjective  General  Chart Reviewed: Yes  Patient assessed for rehabilitation services?: Yes  Additional Pertinent Hx: Pt is a 89 y.o. male who presented to the ED on 1/14/25 following fall, hitting head, stroke-like symptoms. Admitting diagnosis is TIA.  Response To Previous Treatment: Not applicable  Family/Caregiver Present: No  Referring Practitioner: Kp Pineda DO  Referral Date : 01/14/25  Diagnosis: TIA  Follows Commands: Within Functional Limits  Subjective  Subjective: Pt is agreeable to PT.         Social/Functional History  Social/Functional History  Lives With: Alone  Type of Home: House  Home Layout: One level  Home Access: Stairs to enter with rails  Entrance Stairs - Number of Steps: 2-3 LIZY  Bathroom Shower/Tub: Walk-in shower  Bathroom Toilet: Standard  Bathroom Equipment: Grab bars in shower  Home Equipment: Walker - Rolling  Has the patient had two or more falls in the past year or any fall with injury in the past year?: Yes (1 fall leading to admit)  Prior Level of Assist for ADLs: Independent  Prior Level of Assist for Homemaking: Needs assistance (Family does grocery shopping, pt independent in the home)  Prior Level of Assist for Ambulation: Independent household ambulator, with or without device (RW)  Prior Level of Assist for Transfers: Independent  Active : No  Patient's  Info: Family  Occupation: Retired  Type of Occupation: Finances  Leisure & Hobbies: watch sports    Cognition 
nursing staff, family, etc.       JAROD RAINEY MD, FACP  8:23 AM  1/20/2025    
Requires cues for all  Sequencing: Requires cues for all  Cognition Comment: Dysarthric speech and difficult to understand    Objective  Observation/Palpation  Observation: Difficulty managing oral secretions in standing.  Gross Assessment  Strength: Generally decreased, functional (Right hip flexion 1/5, right knee flexion/ext 2-/5, right PF 3+/5, right DF 0/5)    Bed mobility  Supine to Sit: Maximum assistance;2 Person assistance  Sit to Supine: Unable to assess (in recliner at end of session)  Transfers  Sit to Stand: Moderate Assistance;Maximum Assistance;2 Person Assistance  Stand to Sit: Moderate Assistance;Maximum Assistance;2 Person Assistance  Bed to Chair: Dependent/Total (stedy)  Comment: Multiple trials. Repeated cues for hand placement and upright posture. Pt with R sided lean.        Balance  Posture: Fair  Sitting - Static: Fair  Sitting - Dynamic: Fair  Standing - Static: Poor;+ (@RW)  Standing - Dynamic: Poor (@RW)  Exercise Treatment: Seated LAQ and hip flexion x 10 reps - now requires assist with all movement.       AM-PAC - Mobility  AM-PAC Basic Mobility - Inpatient   How much help is needed turning from your back to your side while in a flat bed without using bedrails?: A Lot  How much help is needed moving from lying on your back to sitting on the side of a flat bed without using bedrails?: A Lot  How much help is needed moving to and from a bed to a chair?: Total  How much help is needed standing up from a chair using your arms?: A Lot  How much help is needed walking in hospital room?: Total  How much help is needed climbing 3-5 steps with a railing?: Total  AM-PAC Inpatient Mobility Raw Score : 9  AM-PAC Inpatient T-Scale Score : 30.55  Mobility Inpatient CMS 0-100% Score: 81.38  Mobility Inpatient CMS G-Code Modifier : CM        Goals  Short Term Goals  Time Frame for Short Term Goals: by acute discharge - all goals ongoing as of 1/21/25  Short Term Goal 1: bed mobility mod I  Short Term 
chair;Chair alarm in place;Nurse notified           ADL  Toileting: Dependent/Total  Toileting Skilled Clinical Factors: Pt initially requesting to use the bathroom - brought to toilet via Stedy. After sitting, pt unable to get into comfortable position to void (d/t BSC over toilet), stood and had pt use Purewick to void instead  Functional Mobility: Dependent/Total  Functional Mobility Skilled Clinical Factors: Billie stedy LIFT used for safe mobility from bed>bathroom>recliner chair  Product Used : Bath wipes     Activity Tolerance  Activity Tolerance: Patient limited by fatigue;Patient limited by endurance  Bed mobility  Supine to Sit: Maximum assistance;2 Person assistance  Sit to Supine: Unable to assess (in recliner at end of session)  Transfers  Sit to stand: Moderate assistance;2 Person assistance;Maximum assistance  Stand to sit: Moderate assistance;2 Person assistance;Maximum assistance  Transfer Comments: to/from STEDY. Continues with right side lean and forward flexed posture. Breathing heavily throughout. Pt able to stand ~1 min x2 at a time with repeated cues for upright posture     Cognition  Overall Cognitive Status: Exceptions  Arousal/Alertness: Delayed responses to stimuli  Following Commands: Follows one step commands with repetition;Follows one step commands with increased time  Safety Judgement: Decreased awareness of need for safety;Decreased awareness of need for assistance  Problem Solving: Decreased awareness of errors  Insights: Decreased awareness of deficits  Initiation: Requires cues for all  Sequencing: Requires cues for all  Cognition Comment: Dysarthric speech and difficult to understand. Some element of expressive aphasia  Orientation  Overall Orientation Status: Impaired (DIfficult to fully assess d/t aphasia and dysarthric speech. Increased time to state name and )               Exercise Treatment: Demo'd simple PROM, AAROM to R UE to daughter at bedside. Dtr very eager to assist 
tolerate cup or straw; rec rest breaks PRN d/t concern for potential for fatigue as an exacerbating factor at times; ST to continue to follow/monitor; MBS can be completed with MD order if there is increased med team concern for penetration/aspiration risk as neuro status changes; daughter verbalizing preferences for comfort care with focus on quality over quantity of life, but current plan appears undetermined at this time  Dysphagia ex: patient with reduced endurance for structured strengthening tasks and declines ex; receptive to cues for emphasis/exaggeration of speech sounds with potential benefit for functional OM strengthening but requires max cueing for speech clarity    MEDICAL OR COGNITIVE/BEHAVIORAL FACTORS WHICH CAN EXACERBATE:   Comorbidities  Medical dx  Fatigue/Reduced endurance: suspect fatigue exacerbates dysphagia and dysarthria with rec for rest breaks PRN (especially prior to PO); reduced endurance remains a barrier to direct tx    Dysphagia Prognosis: fair, guarded  Barriers to progress: co-morbidities, medical dx, generalized weakness/fatigue    RECOMMENDATIONS     Recommended Diet and Intervention 1/22/2025:  Diet Solids Recommendation:  Minced and Moist texture Liquid Consistency Recommendation:  Thin liquids  , small sips Recommended form of Meds: Crushed in puree/pudding     Compensatory Swallowing Strategies:  Upright as possible with all PO intake , Small bites/sips , Eat/feed slowly, Remain upright 30-45 min , clear oral pocketing    Eating Assistance Recommendation: Dependent     Discharge Recommendations: Recommend ongoing SLP for speech and dysphagia therapy upon discharge from hospital pending goals of care; dysphagia and dysarthria symptoms persist    GOALS / PLAN     PLAN OF CARE: Speech therapy for dysphagia tx 2-5 times/week    SHORT TERM DYSPHAGIA GOALS  Pt will functionally tolerate ongoing assessment of swallow function with diet to be determined as indicated continued 
above.    Dysphagia Therapeutic Intervention:  Diet Tolerance Monitoring , Patient/Family Education , Therapeutic Trials with SLP       DATA     Pain:  Did not state    Respiratory Status: O2 via nasal cannula, 2L    Vision: Wears Glasses  Hearing: [place], Has hearing aids    Dentition: Adequate    Patient Positioning: Fully upright, In chair    Dysphagia Tx: seen with lunch portion; session terminated upon satiation  PO Trials: Minced and Moist texture, Thin liquids     prolonged bolus formation and movement with minced; oral pocketing with minced is cleared mostly with liquid wash, but does benefit from assist with clearance at end of meal  Clinical signs/symptoms with concerns for  cough with large sips thin . Aforementioned symptoms contribute to concerns for delayed swallow initiation, reduced laryngeal ROM  Pt reporting s/s of concern for Esophageal problems: None reported  Impressions of Direct Dysphagia tx: consistent with prior dysphagia status  Dysphagia ex:  receptive to cues for emphasis/ exaggeration of speech sounds for functional OM strengthening; declines direct ex  Training in compensatory strategies: cueing with meal for oral clearance  Pt response to ex/training: Verbalized understanding, Needs continued reinforcement/education      EDUCATION     Provided education regarding role of SLP, results of assessment, recommendations and general speech pathology plan of care.   Patient Response: Pt verbalized understanding and agreement , Needs reinforcement  Family education: Family verbalizes understanding/agreement  Staff education: RN verbalizes understanding      If patient discharges prior to next visit, this note will serve as discharge.     Timed Code Minutes: 0  Total Treatment Minutes: 28    Electronically signed by:    Gabrielle Vicente MA, CCC-SLP, #[id number]  Speech-Language Pathologist  Portable phone: [phone]   01/21/25  12:39 PM  
w/ supervision  Long Term Goals  Time Frame for Long Term Goals : LTG=STG  Patient Goals   Patient goals : to return home      Therapy Time   Individual Concurrent Group Co-treatment   Time In 0745         Time Out 0825         Minutes 40         Timed Code Treatment Minutes: 25 Minutes       Alma Rosa Rosario OTR/L [id number]

## 2025-01-23 NOTE — PLAN OF CARE
Problem: Discharge Planning  Goal: Discharge to home or other facility with appropriate resources  1/23/2025 1329 by Tod De Dios RN  Outcome: Adequate for Discharge     Problem: Pain  Goal: Verbalizes/displays adequate comfort level or baseline comfort level  1/23/2025 1329 by Tod De Dios RN  Outcome: Adequate for Discharge     Problem: ABCDS Injury Assessment  Goal: Absence of physical injury  1/23/2025 1329 by Tod De Dios RN  Outcome: Adequate for Discharge     Problem: Safety - Adult  Goal: Free from fall injury  1/23/2025 1329 by Tod De Dios RN  Outcome: Adequate for Discharge     Problem: Skin/Tissue Integrity  Goal: Absence of new skin breakdown  Description: 1.  Monitor for areas of redness and/or skin breakdown  2.  Assess vascular access sites hourly  3.  Every 4-6 hours minimum:  Change oxygen saturation probe site  4.  Every 4-6 hours:  If on nasal continuous positive airway pressure, respiratory therapy assess nares and determine need for appliance change or resting period.  1/23/2025 1329 by Tod De Dios RN  Outcome: Adequate for Discharge     Problem: Neurosensory - Adult  Goal: Achieves stable or improved neurological status  1/23/2025 1329 by Tod De Dios RN  Outcome: Adequate for Discharge     Problem: Neurosensory - Adult  Goal: Achieves maximal functionality and self care  1/23/2025 1329 by Tod De Dios RN  Outcome: Adequate for Discharge     Problem: Respiratory - Adult  Goal: Achieves optimal ventilation and oxygenation  1/23/2025 1329 by Tod De Dios RN  Outcome: Adequate for Discharge     Problem: Cardiovascular - Adult  Goal: Maintains optimal cardiac output and hemodynamic stability  1/23/2025 1329 by Tod De Dios RN  Outcome: Adequate for Discharge     Problem: Cardiovascular - Adult  Goal: Absence of cardiac dysrhythmias or at baseline  1/23/2025 1329 by Tod De Dios RN  Outcome: Adequate for Discharge     Problem: Skin/Tissue Integrity - Adult  Goal:

## 2025-01-24 ENCOUNTER — CARE COORDINATION (OUTPATIENT)
Dept: CASE MANAGEMENT | Age: 89
End: 2025-01-24

## 2025-01-24 NOTE — CARE COORDINATION
Care Transitions Note    CTN contacted [facility] RN Susan Zheng who confirms that Milton Victor was admitted to their services on 1/24/2025. He will not be included in care transition.     Jessica Cunningham RN  Care Transition Nurse  [phone] mobile    Future Appointments   Date Time Provider Department Center   7/17/2025 12:30 PM Moses Hsu MD [facility]